# Patient Record
Sex: FEMALE | URBAN - METROPOLITAN AREA
[De-identification: names, ages, dates, MRNs, and addresses within clinical notes are randomized per-mention and may not be internally consistent; named-entity substitution may affect disease eponyms.]

---

## 2020-04-29 PROBLEM — Z00.00 ENCOUNTER FOR PREVENTIVE HEALTH EXAMINATION: Status: ACTIVE | Noted: 2020-04-29

## 2021-11-29 ENCOUNTER — INPATIENT (INPATIENT)
Facility: HOSPITAL | Age: 86
LOS: 4 days | Discharge: HOME HEALTH | End: 2021-12-04
Attending: INTERNAL MEDICINE | Admitting: INTERNAL MEDICINE
Payer: MEDICARE

## 2021-11-29 VITALS
HEART RATE: 118 BPM | WEIGHT: 149.91 LBS | OXYGEN SATURATION: 100 % | TEMPERATURE: 98 F | DIASTOLIC BLOOD PRESSURE: 66 MMHG | HEIGHT: 60 IN | SYSTOLIC BLOOD PRESSURE: 97 MMHG | RESPIRATION RATE: 15 BRPM

## 2021-11-29 LAB
ALBUMIN SERPL ELPH-MCNC: 2 G/DL — LOW (ref 3.3–5)
ALP SERPL-CCNC: 138 U/L — HIGH (ref 40–120)
ALT FLD-CCNC: 11 U/L — LOW (ref 12–78)
ANION GAP SERPL CALC-SCNC: 8 MMOL/L — SIGNIFICANT CHANGE UP (ref 5–17)
APTT BLD: 30.1 SEC — SIGNIFICANT CHANGE UP (ref 27.5–35.5)
AST SERPL-CCNC: 18 U/L — SIGNIFICANT CHANGE UP (ref 15–37)
BASOPHILS # BLD AUTO: 0.03 K/UL — SIGNIFICANT CHANGE UP (ref 0–0.2)
BASOPHILS NFR BLD AUTO: 0.4 % — SIGNIFICANT CHANGE UP (ref 0–2)
BILIRUB SERPL-MCNC: 0.4 MG/DL — SIGNIFICANT CHANGE UP (ref 0.2–1.2)
BUN SERPL-MCNC: 18 MG/DL — SIGNIFICANT CHANGE UP (ref 7–23)
CALCIUM SERPL-MCNC: 8.2 MG/DL — LOW (ref 8.5–10.1)
CHLORIDE SERPL-SCNC: 111 MMOL/L — HIGH (ref 96–108)
CO2 SERPL-SCNC: 23 MMOL/L — SIGNIFICANT CHANGE UP (ref 22–31)
CREAT SERPL-MCNC: 0.76 MG/DL — SIGNIFICANT CHANGE UP (ref 0.5–1.3)
EOSINOPHIL # BLD AUTO: 0.17 K/UL — SIGNIFICANT CHANGE UP (ref 0–0.5)
EOSINOPHIL NFR BLD AUTO: 2.5 % — SIGNIFICANT CHANGE UP (ref 0–6)
FLUAV AG NPH QL: SIGNIFICANT CHANGE UP
FLUBV AG NPH QL: SIGNIFICANT CHANGE UP
GLUCOSE SERPL-MCNC: 159 MG/DL — HIGH (ref 70–99)
HCT VFR BLD CALC: 23.6 % — LOW (ref 34.5–45)
HGB BLD-MCNC: 6.9 G/DL — CRITICAL LOW (ref 11.5–15.5)
IMM GRANULOCYTES NFR BLD AUTO: 0.4 % — SIGNIFICANT CHANGE UP (ref 0–1.5)
INR BLD: 2.22 RATIO — HIGH (ref 0.88–1.16)
LYMPHOCYTES # BLD AUTO: 1.97 K/UL — SIGNIFICANT CHANGE UP (ref 1–3.3)
LYMPHOCYTES # BLD AUTO: 28.7 % — SIGNIFICANT CHANGE UP (ref 13–44)
MAGNESIUM SERPL-MCNC: 2.2 MG/DL — SIGNIFICANT CHANGE UP (ref 1.6–2.6)
MCHC RBC-ENTMCNC: 22 PG — LOW (ref 27–34)
MCHC RBC-ENTMCNC: 29.2 GM/DL — LOW (ref 32–36)
MCV RBC AUTO: 75.2 FL — LOW (ref 80–100)
MONOCYTES # BLD AUTO: 0.39 K/UL — SIGNIFICANT CHANGE UP (ref 0–0.9)
MONOCYTES NFR BLD AUTO: 5.7 % — SIGNIFICANT CHANGE UP (ref 2–14)
NEUTROPHILS # BLD AUTO: 4.28 K/UL — SIGNIFICANT CHANGE UP (ref 1.8–7.4)
NEUTROPHILS NFR BLD AUTO: 62.3 % — SIGNIFICANT CHANGE UP (ref 43–77)
NRBC # BLD: 0 /100 WBCS — SIGNIFICANT CHANGE UP (ref 0–0)
PHOSPHATE SERPL-MCNC: 2.5 MG/DL — SIGNIFICANT CHANGE UP (ref 2.5–4.5)
PLATELET # BLD AUTO: 305 K/UL — SIGNIFICANT CHANGE UP (ref 150–400)
POTASSIUM SERPL-MCNC: 3.1 MMOL/L — LOW (ref 3.5–5.3)
POTASSIUM SERPL-SCNC: 3.1 MMOL/L — LOW (ref 3.5–5.3)
PROT SERPL-MCNC: 6.9 GM/DL — SIGNIFICANT CHANGE UP (ref 6–8.3)
PROTHROM AB SERPL-ACNC: 24.8 SEC — HIGH (ref 10.6–13.6)
RBC # BLD: 3.14 M/UL — LOW (ref 3.8–5.2)
RBC # FLD: 17.2 % — HIGH (ref 10.3–14.5)
SARS-COV-2 RNA SPEC QL NAA+PROBE: SIGNIFICANT CHANGE UP
SODIUM SERPL-SCNC: 142 MMOL/L — SIGNIFICANT CHANGE UP (ref 135–145)
WBC # BLD: 6.87 K/UL — SIGNIFICANT CHANGE UP (ref 3.8–10.5)
WBC # FLD AUTO: 6.87 K/UL — SIGNIFICANT CHANGE UP (ref 3.8–10.5)

## 2021-11-29 PROCEDURE — 70450 CT HEAD/BRAIN W/O DYE: CPT | Mod: 26,MA

## 2021-11-29 PROCEDURE — 93010 ELECTROCARDIOGRAM REPORT: CPT

## 2021-11-29 PROCEDURE — 99291 CRITICAL CARE FIRST HOUR: CPT

## 2021-11-29 RX ORDER — SODIUM CHLORIDE 9 MG/ML
1000 INJECTION INTRAMUSCULAR; INTRAVENOUS; SUBCUTANEOUS ONCE
Refills: 0 | Status: COMPLETED | OUTPATIENT
Start: 2021-11-29 | End: 2021-11-29

## 2021-11-29 RX ORDER — POTASSIUM CHLORIDE 20 MEQ
10 PACKET (EA) ORAL
Refills: 0 | Status: COMPLETED | OUTPATIENT
Start: 2021-11-29 | End: 2021-11-30

## 2021-11-29 RX ADMIN — SODIUM CHLORIDE 1000 MILLILITER(S): 9 INJECTION INTRAMUSCULAR; INTRAVENOUS; SUBCUTANEOUS at 23:55

## 2021-11-29 NOTE — ED ADULT NURSE REASSESSMENT NOTE - NS ED NURSE REASSESS COMMENT FT1
pt found to be soiled upon arrival to the ED. pt cleaned by staff. patient and linen clean and dry. pt placed in gown. on cardiac monitor at bedside pt found to be soiled upon arrival to the ED. pt cleaned by staff. patient and linen clean and dry. pt placed in gown. on cardiac monitor at bedside. seizure precautions intact. fall and skin precautions intact

## 2021-11-29 NOTE — ED ADULT NURSE NOTE - OBJECTIVE STATEMENT
pt received to bed 2 s/p witnessed seizure per EMS. as per pt's son-in-law Charley, pt had a seizure secondary to low blood pressure. hx stroke with residual left sided weakness. pt is confused at baseline. family states that patient is at her baseline mental status at this time. on cardiac monitor at bedside

## 2021-11-30 DIAGNOSIS — G40.909 EPILEPSY, UNSPECIFIED, NOT INTRACTABLE, WITHOUT STATUS EPILEPTICUS: ICD-10-CM

## 2021-11-30 DIAGNOSIS — K56.41 FECAL IMPACTION: ICD-10-CM

## 2021-11-30 DIAGNOSIS — N30.00 ACUTE CYSTITIS WITHOUT HEMATURIA: ICD-10-CM

## 2021-11-30 DIAGNOSIS — E78.5 HYPERLIPIDEMIA, UNSPECIFIED: ICD-10-CM

## 2021-11-30 DIAGNOSIS — D62 ACUTE POSTHEMORRHAGIC ANEMIA: ICD-10-CM

## 2021-11-30 DIAGNOSIS — I48.20 CHRONIC ATRIAL FIBRILLATION, UNSPECIFIED: ICD-10-CM

## 2021-11-30 DIAGNOSIS — E87.6 HYPOKALEMIA: ICD-10-CM

## 2021-11-30 DIAGNOSIS — I10 ESSENTIAL (PRIMARY) HYPERTENSION: ICD-10-CM

## 2021-11-30 LAB
ABO RH CONFIRMATION: SIGNIFICANT CHANGE UP
ALBUMIN SERPL ELPH-MCNC: 2 G/DL — LOW (ref 3.3–5)
ALP SERPL-CCNC: 105 U/L — SIGNIFICANT CHANGE UP (ref 40–120)
ALT FLD-CCNC: 10 U/L — LOW (ref 12–78)
ANION GAP SERPL CALC-SCNC: 8 MMOL/L — SIGNIFICANT CHANGE UP (ref 5–17)
ANION GAP SERPL CALC-SCNC: 8 MMOL/L — SIGNIFICANT CHANGE UP (ref 5–17)
APPEARANCE UR: ABNORMAL
AST SERPL-CCNC: 13 U/L — LOW (ref 15–37)
BACTERIA # UR AUTO: ABNORMAL
BILIRUB SERPL-MCNC: 0.6 MG/DL — SIGNIFICANT CHANGE UP (ref 0.2–1.2)
BILIRUB UR-MCNC: ABNORMAL
BLD GP AB SCN SERPL QL: SIGNIFICANT CHANGE UP
BUN SERPL-MCNC: 11 MG/DL — SIGNIFICANT CHANGE UP (ref 7–23)
BUN SERPL-MCNC: 14 MG/DL — SIGNIFICANT CHANGE UP (ref 7–23)
CALCIUM SERPL-MCNC: 8 MG/DL — LOW (ref 8.5–10.1)
CALCIUM SERPL-MCNC: 8.1 MG/DL — LOW (ref 8.5–10.1)
CHLORIDE SERPL-SCNC: 110 MMOL/L — HIGH (ref 96–108)
CHLORIDE SERPL-SCNC: 115 MMOL/L — HIGH (ref 96–108)
CO2 SERPL-SCNC: 20 MMOL/L — LOW (ref 22–31)
CO2 SERPL-SCNC: 23 MMOL/L — SIGNIFICANT CHANGE UP (ref 22–31)
COLOR SPEC: ABNORMAL
CREAT SERPL-MCNC: 0.59 MG/DL — SIGNIFICANT CHANGE UP (ref 0.5–1.3)
CREAT SERPL-MCNC: 0.73 MG/DL — SIGNIFICANT CHANGE UP (ref 0.5–1.3)
DIFF PNL FLD: ABNORMAL
DIGOXIN SERPL-MCNC: <.1 NG/ML — LOW (ref 0.8–2)
EPI CELLS # UR: SIGNIFICANT CHANGE UP
GLUCOSE BLDC GLUCOMTR-MCNC: 160 MG/DL — HIGH (ref 70–99)
GLUCOSE SERPL-MCNC: 104 MG/DL — HIGH (ref 70–99)
GLUCOSE SERPL-MCNC: 141 MG/DL — HIGH (ref 70–99)
GLUCOSE UR QL: NEGATIVE MG/DL — SIGNIFICANT CHANGE UP
HCT VFR BLD CALC: 27.6 % — LOW (ref 34.5–45)
HCT VFR BLD CALC: 28 % — LOW (ref 34.5–45)
HGB BLD-MCNC: 8.2 G/DL — LOW (ref 11.5–15.5)
HGB BLD-MCNC: 8.3 G/DL — LOW (ref 11.5–15.5)
KETONES UR-MCNC: NEGATIVE — SIGNIFICANT CHANGE UP
LEUKOCYTE ESTERASE UR-ACNC: ABNORMAL
MAGNESIUM SERPL-MCNC: 2.2 MG/DL — SIGNIFICANT CHANGE UP (ref 1.6–2.6)
MCHC RBC-ENTMCNC: 23.3 PG — LOW (ref 27–34)
MCHC RBC-ENTMCNC: 23.5 PG — LOW (ref 27–34)
MCHC RBC-ENTMCNC: 29.3 GM/DL — LOW (ref 32–36)
MCHC RBC-ENTMCNC: 30.1 GM/DL — LOW (ref 32–36)
MCV RBC AUTO: 78.2 FL — LOW (ref 80–100)
MCV RBC AUTO: 79.5 FL — LOW (ref 80–100)
NITRITE UR-MCNC: POSITIVE
NRBC # BLD: 0 /100 WBCS — SIGNIFICANT CHANGE UP (ref 0–0)
NRBC # BLD: 0 /100 WBCS — SIGNIFICANT CHANGE UP (ref 0–0)
PH UR: 6.5 — SIGNIFICANT CHANGE UP (ref 5–8)
PHOSPHATE SERPL-MCNC: 1.8 MG/DL — LOW (ref 2.5–4.5)
PLATELET # BLD AUTO: 283 K/UL — SIGNIFICANT CHANGE UP (ref 150–400)
PLATELET # BLD AUTO: 283 K/UL — SIGNIFICANT CHANGE UP (ref 150–400)
POTASSIUM SERPL-MCNC: 2.9 MMOL/L — CRITICAL LOW (ref 3.5–5.3)
POTASSIUM SERPL-MCNC: 4.2 MMOL/L — SIGNIFICANT CHANGE UP (ref 3.5–5.3)
POTASSIUM SERPL-SCNC: 2.9 MMOL/L — CRITICAL LOW (ref 3.5–5.3)
POTASSIUM SERPL-SCNC: 4.2 MMOL/L — SIGNIFICANT CHANGE UP (ref 3.5–5.3)
PROT SERPL-MCNC: 6.5 GM/DL — SIGNIFICANT CHANGE UP (ref 6–8.3)
PROT UR-MCNC: 500 MG/DL
RBC # BLD: 3.52 M/UL — LOW (ref 3.8–5.2)
RBC # BLD: 3.53 M/UL — LOW (ref 3.8–5.2)
RBC # FLD: 18 % — HIGH (ref 10.3–14.5)
RBC # FLD: 18.4 % — HIGH (ref 10.3–14.5)
RBC CASTS # UR COMP ASSIST: >50 /HPF (ref 0–4)
SODIUM SERPL-SCNC: 141 MMOL/L — SIGNIFICANT CHANGE UP (ref 135–145)
SODIUM SERPL-SCNC: 143 MMOL/L — SIGNIFICANT CHANGE UP (ref 135–145)
SP GR SPEC: 1.01 — SIGNIFICANT CHANGE UP (ref 1.01–1.02)
TSH SERPL-MCNC: 4.19 UIU/ML — HIGH (ref 0.36–3.74)
UROBILINOGEN FLD QL: NEGATIVE MG/DL — SIGNIFICANT CHANGE UP
WBC # BLD: 5.47 K/UL — SIGNIFICANT CHANGE UP (ref 3.8–10.5)
WBC # BLD: 5.88 K/UL — SIGNIFICANT CHANGE UP (ref 3.8–10.5)
WBC # FLD AUTO: 5.47 K/UL — SIGNIFICANT CHANGE UP (ref 3.8–10.5)
WBC # FLD AUTO: 5.88 K/UL — SIGNIFICANT CHANGE UP (ref 3.8–10.5)
WBC UR QL: ABNORMAL

## 2021-11-30 PROCEDURE — 99291 CRITICAL CARE FIRST HOUR: CPT

## 2021-11-30 PROCEDURE — 74174 CTA ABD&PLVS W/CONTRAST: CPT | Mod: 26,MA

## 2021-11-30 PROCEDURE — 99222 1ST HOSP IP/OBS MODERATE 55: CPT

## 2021-11-30 PROCEDURE — 93010 ELECTROCARDIOGRAM REPORT: CPT

## 2021-11-30 RX ORDER — HALOPERIDOL DECANOATE 100 MG/ML
1 INJECTION INTRAMUSCULAR ONCE
Refills: 0 | Status: COMPLETED | OUTPATIENT
Start: 2021-11-30 | End: 2021-11-30

## 2021-11-30 RX ORDER — LEVETIRACETAM 250 MG/1
750 TABLET, FILM COATED ORAL
Refills: 0 | Status: DISCONTINUED | OUTPATIENT
Start: 2021-11-30 | End: 2021-12-04

## 2021-11-30 RX ORDER — POTASSIUM CHLORIDE 20 MEQ
40 PACKET (EA) ORAL ONCE
Refills: 0 | Status: COMPLETED | OUTPATIENT
Start: 2021-11-30 | End: 2021-11-30

## 2021-11-30 RX ORDER — DILTIAZEM HCL 120 MG
17 CAPSULE, EXT RELEASE 24 HR ORAL ONCE
Refills: 0 | Status: COMPLETED | OUTPATIENT
Start: 2021-11-30 | End: 2021-11-30

## 2021-11-30 RX ORDER — LEVETIRACETAM 250 MG/1
1000 TABLET, FILM COATED ORAL ONCE
Refills: 0 | Status: COMPLETED | OUTPATIENT
Start: 2021-11-30 | End: 2021-11-30

## 2021-11-30 RX ORDER — ATORVASTATIN CALCIUM 80 MG/1
20 TABLET, FILM COATED ORAL AT BEDTIME
Refills: 0 | Status: DISCONTINUED | OUTPATIENT
Start: 2021-11-30 | End: 2021-12-04

## 2021-11-30 RX ORDER — LOSARTAN POTASSIUM 100 MG/1
25 TABLET, FILM COATED ORAL DAILY
Refills: 0 | Status: DISCONTINUED | OUTPATIENT
Start: 2021-11-30 | End: 2021-12-01

## 2021-11-30 RX ORDER — CEFTRIAXONE 500 MG/1
1000 INJECTION, POWDER, FOR SOLUTION INTRAMUSCULAR; INTRAVENOUS ONCE
Refills: 0 | Status: COMPLETED | OUTPATIENT
Start: 2021-11-30 | End: 2021-11-30

## 2021-11-30 RX ORDER — SODIUM,POTASSIUM PHOSPHATES 278-250MG
2 POWDER IN PACKET (EA) ORAL
Refills: 0 | Status: COMPLETED | OUTPATIENT
Start: 2021-11-30 | End: 2021-12-02

## 2021-11-30 RX ORDER — METOPROLOL TARTRATE 50 MG
25 TABLET ORAL
Refills: 0 | Status: DISCONTINUED | OUTPATIENT
Start: 2021-11-30 | End: 2021-12-04

## 2021-11-30 RX ORDER — POTASSIUM CHLORIDE 20 MEQ
10 PACKET (EA) ORAL
Refills: 0 | Status: COMPLETED | OUTPATIENT
Start: 2021-11-30 | End: 2021-11-30

## 2021-11-30 RX ORDER — CEFTRIAXONE 500 MG/1
1000 INJECTION, POWDER, FOR SOLUTION INTRAMUSCULAR; INTRAVENOUS
Refills: 0 | Status: COMPLETED | OUTPATIENT
Start: 2021-12-01 | End: 2021-12-03

## 2021-11-30 RX ORDER — ACETAMINOPHEN 500 MG
1000 TABLET ORAL ONCE
Refills: 0 | Status: COMPLETED | OUTPATIENT
Start: 2021-11-30 | End: 2021-11-30

## 2021-11-30 RX ORDER — LEVETIRACETAM 250 MG/1
500 TABLET, FILM COATED ORAL
Refills: 0 | Status: DISCONTINUED | OUTPATIENT
Start: 2021-11-30 | End: 2021-11-30

## 2021-11-30 RX ORDER — LACTULOSE 10 G/15ML
10 SOLUTION ORAL
Refills: 0 | Status: COMPLETED | OUTPATIENT
Start: 2021-11-30 | End: 2021-12-01

## 2021-11-30 RX ORDER — MINERAL OIL
133 OIL (ML) MISCELLANEOUS ONCE
Refills: 0 | Status: COMPLETED | OUTPATIENT
Start: 2021-11-30 | End: 2021-11-30

## 2021-11-30 RX ORDER — SENNA PLUS 8.6 MG/1
2 TABLET ORAL AT BEDTIME
Refills: 0 | Status: DISCONTINUED | OUTPATIENT
Start: 2021-11-30 | End: 2021-12-04

## 2021-11-30 RX ADMIN — Medication 40 MILLIEQUIVALENT(S): at 14:24

## 2021-11-30 RX ADMIN — ATORVASTATIN CALCIUM 20 MILLIGRAM(S): 80 TABLET, FILM COATED ORAL at 21:20

## 2021-11-30 RX ADMIN — Medication 100 MILLIEQUIVALENT(S): at 12:31

## 2021-11-30 RX ADMIN — Medication 100 MILLIEQUIVALENT(S): at 02:47

## 2021-11-30 RX ADMIN — LACTULOSE 10 GRAM(S): 10 SOLUTION ORAL at 17:14

## 2021-11-30 RX ADMIN — HALOPERIDOL DECANOATE 1 MILLIGRAM(S): 100 INJECTION INTRAMUSCULAR at 19:24

## 2021-11-30 RX ADMIN — LEVETIRACETAM 500 MILLIGRAM(S): 250 TABLET, FILM COATED ORAL at 17:13

## 2021-11-30 RX ADMIN — Medication 2 PACKET(S): at 21:20

## 2021-11-30 RX ADMIN — Medication 1000 MILLIGRAM(S): at 19:53

## 2021-11-30 RX ADMIN — Medication 1 MILLIGRAM(S): at 22:41

## 2021-11-30 RX ADMIN — CEFTRIAXONE 1000 MILLIGRAM(S): 500 INJECTION, POWDER, FOR SOLUTION INTRAMUSCULAR; INTRAVENOUS at 04:18

## 2021-11-30 RX ADMIN — SENNA PLUS 2 TABLET(S): 8.6 TABLET ORAL at 21:20

## 2021-11-30 RX ADMIN — Medication 100 MILLIEQUIVALENT(S): at 14:37

## 2021-11-30 RX ADMIN — Medication 17 MILLIGRAM(S): at 19:36

## 2021-11-30 RX ADMIN — Medication 133 MILLILITER(S): at 03:22

## 2021-11-30 RX ADMIN — Medication 100 MILLIEQUIVALENT(S): at 15:54

## 2021-11-30 RX ADMIN — Medication 100 MILLIEQUIVALENT(S): at 04:42

## 2021-11-30 RX ADMIN — LOSARTAN POTASSIUM 25 MILLIGRAM(S): 100 TABLET, FILM COATED ORAL at 06:39

## 2021-11-30 RX ADMIN — Medication 100 MILLIEQUIVALENT(S): at 01:55

## 2021-11-30 RX ADMIN — CEFTRIAXONE 100 MILLIGRAM(S): 500 INJECTION, POWDER, FOR SOLUTION INTRAMUSCULAR; INTRAVENOUS at 03:48

## 2021-11-30 RX ADMIN — Medication 10 MILLIEQUIVALENT(S): at 02:47

## 2021-11-30 RX ADMIN — SODIUM CHLORIDE 1000 MILLILITER(S): 9 INJECTION INTRAMUSCULAR; INTRAVENOUS; SUBCUTANEOUS at 03:49

## 2021-11-30 RX ADMIN — Medication 400 MILLIGRAM(S): at 19:49

## 2021-11-30 RX ADMIN — LEVETIRACETAM 400 MILLIGRAM(S): 250 TABLET, FILM COATED ORAL at 04:22

## 2021-11-30 RX ADMIN — Medication 85 MILLIMOLE(S): at 22:43

## 2021-11-30 NOTE — PATIENT PROFILE ADULT - FALL HARM RISK - RISK INTERVENTIONS
Assistance OOB with selected safe patient handling equipment/Communicate Fall Risk and Risk Factors to all staff, patient, and family/Discuss with provider need for PT consult/Monitor for mental status changes/Monitor gait and stability/Reorient to person, place and time as needed/Use of alarms - bed, chair and/or voice tab/Visual Cue: Yellow wristband/Bed in lowest position, wheels locked, appropriate side rails in place/Call bell, personal items and telephone in reach/Instruct patient to call for assistance before getting out of bed or chair/Non-slip footwear when patient is out of bed/[place] to call system/Purposeful Proactive Rounding Assistance OOB with selected safe patient handling equipment/Assistance with ambulation/Communicate Fall Risk and Risk Factors to all staff, patient, and family/Discuss with provider need for PT consult/Monitor gait and stability/Reinforce activity limits and safety measures with patient and family/Visual Cue: Yellow wristband/Bed in lowest position, wheels locked, appropriate side rails in place/Call bell, personal items and telephone in reach/Instruct patient to call for assistance before getting out of bed or chair/Non-slip footwear when patient is out of bed/[place] to call system/Physically safe environment - no spills, clutter or unnecessary equipment/Purposeful Proactive Rounding/Room/bathroom lighting operational, light cord in reach

## 2021-11-30 NOTE — H&P ADULT - NSHPPHYSICALEXAM_GEN_ALL_CORE
Physical exam:  General: patient in no acute distress, resting comfortably  Head:  Atraumatic, Normocephalic  Eyes: EOMI, PERRLA, clear sclera  Neck: Supple, thyroid nontender, non enlarged  Cardio: S1/S2 +ve, regular rate and rhythm, no M/G/R  Resp: clear to ausculation bilaterally, no rales or wheezes  GI: abdomen soft, nontender, non distended, no guarding, BS +ve x 4  Ext: no significant pedal edema  Neuro: awake and alert.  CN 2-12 intact, gross movement in al  Skin: No rashes or lesions

## 2021-11-30 NOTE — ED PROVIDER NOTE - PHYSICAL EXAMINATION
Constitutional: Elderly appearing, awake, alert, and in no apparent distress.  ENMT: Airway patent.  Eyes: Clear bilaterally, pupils equal, round and reactive to light.  Cardiac: Normal rate, regular rhythm.  Heart sounds S1, S2.  Respiratory: Breath sounds clear and equal bilaterally.  Gastrointestinal: Abdomen soft, non-tender, no guarding.  Neurological: Weakness in LUE and LLE as compared to R.  Skin: No evidence of rash.

## 2021-11-30 NOTE — H&P ADULT - HISTORY OF PRESENT ILLNESS
Patient is an 88F with a PMH of CVA with L sided weakness, Afib on eliquis, seizures on keppra, HTN, HLD, nephrolithiasis w/ L ureteral stent who was sent to the ED for seizures.  Patient unable to provide full history.  Per ED attending who spoke to family members, patient was found to have a 1-2 minute seizure with generalized shaking and post ictal phase afterwards.  Reports that she has had seizures since her stroke in 2010.  Patient noted to have significant hematuria in ED.  Tachycardic to 143 and initially hypotensive in ED, labs reveal anemia and hypokalemia.  CT shows bladder cystitis vs malignancy.  Will admit to tele.

## 2021-11-30 NOTE — ED ADULT NURSE REASSESSMENT NOTE - NS ED NURSE REASSESS COMMENT FT1
pt is resting in bed 2. blood transfusion in progress. no adverse reactions to blood transfusion. no acute distress noted. will continue to monitor. on cardiac monitor at bedside. pt is admitted and is awaiting bed assignment.

## 2021-11-30 NOTE — H&P ADULT - PROBLEM SELECTOR PLAN 2
WBCs and leuk esterase in urine  Started on ceftriaxone in ED.  Continue for now  Deescalate antibiotic when cultures return

## 2021-11-30 NOTE — ED PROVIDER NOTE - CARE PLAN
Principal Discharge DX:	Anemia due to acute blood loss  Secondary Diagnosis:	Hematuria  Secondary Diagnosis:	Seizures  Secondary Diagnosis:	Acute UTI   1

## 2021-11-30 NOTE — ED PROVIDER NOTE - CLINICAL SUMMARY MEDICAL DECISION MAKING FREE TEXT BOX
pt with impressive hematuria following straight cath in setting of recent L ureteral stent removal. notably anemic to 6's yet per son -- no formal hx of anemia. Plan cta a/p for further assessment. Plan also transfusion and admission.     Pt has been 90/60s pressure with map low 60s in ED. Mentation at baseline. will monitor for response to prbcs and dispo from there. pt with impressive hematuria following straight cath in setting of recent L ureteral stent. notably anemic to 6's yet per son -- no formal hx of anemia. Plan cta a/p for further assessment. Plan also transfusion and admission.     Pt has been 90/60s pressure with map low 60s in ED. Mentation at baseline. will monitor for response to prbcs and dispo from there.

## 2021-11-30 NOTE — CONSULT NOTE ADULT - ASSESSMENT
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 Seizure probable provoked by severe anemia  - no aspirin/AC due to anemia; re-start when ok with primary team  - Lipitor for secondary stroke prevention  - will follow    Thank you for the consult.

## 2021-11-30 NOTE — DOWNTIME INTERRUPTION NOTE - WHICH MANUAL FORMS INITIATED?
Down time from Monday, 29 November 2021 At 10pm to Tuesday 30 November 2021 at 12 noon. Documentation done on paper chart.

## 2021-11-30 NOTE — ED PROVIDER NOTE - PROGRESS NOTE DETAILS
Festus Novoa DO: dr. haynes made aware of the case via phone and added to the chart as a consultant.

## 2021-11-30 NOTE — CONSULT NOTE ADULT - SUBJECTIVE AND OBJECTIVE BOX
HPI:  Patient is an 88F with a PMH of CVA with L sided weakness, Afib on eliquis, seizures on keppra, HTN, HLD, nephrolithiasis w/ L ureteral stent who was sent to the ED for seizures.  Patient unable to provide full history.  Per ED attending who spoke to family members, patient was found to have a 1-2 minute seizure with generalized shaking and post ictal phase afterwards.  Reports that she has had seizures since her stroke in .  Patient noted to have significant hematuria in ED.  Tachycardic to 143 and initially hypotensive in ED, labs reveal anemia and hypokalemia. Transfused 1u PRBC in ED. CT shows bladder cystitis vs malignancy.  Will admit to tele. (2021 05:32). Urology consulted for hematuria.     Patient seen and examined at bedside. Confused, poor historian, unable to answer questions appropriately. Afebrile, no acute events per RN.     PAST MEDICAL & SURGICAL HISTORY:  Seizure disorder    Review of Systems:  Unable to assess due to poor mental status    MEDICATIONS  (STANDING):  atorvastatin 20 milliGRAM(s) Oral at bedtime  cefTRIAXone   IVPB 1000 milliGRAM(s) IV Intermittent <User Schedule>  levETIRAcetam 500 milliGRAM(s) Oral two times a day  losartan 25 milliGRAM(s) Oral daily  potassium chloride  10 mEq/100 mL IVPB 10 milliEquivalent(s) IV Intermittent every 1 hour  senna 2 Tablet(s) Oral at bedtime    MEDICATIONS  (PRN):    Allergies    No Known Allergies    Intolerances      SOCIAL HISTORY   Unable to assess due to poor mental status    FAMILY HISTORY:  FH: HTN (hypertension)    Vital Signs Last 24 Hrs  T(C): 36.2 (2021 11:26), Max: 37.5 (2021 23:40)  T(F): 97.1 (2021 11:26), Max: 99.5 (2021 23:40)  HR: 98 (:) (76 - 143)  BP: 119/80 (2021 11:) (89/51 - 128/68)  BP(mean): 61 (2021 01:57) (59 - 63)  RR: 16 (:) (15 - 29)  SpO2: 96% (2021 11:26) (92% - 100%)    Physical Exam:  General:  Appears stated age, NAD  Eyes: SUNNI  HENT:  WNL, no JVD  Chest: clear breath sounds  Cardiovascular: Regular rate & rhythm  Abdomen: soft, non tender, non distended  : no suprapubic tenderness or CVAT  Extremities: calf soft, non tender b/l  Neuro: AAO x1 to self    LABS:                        8.2    5.88  )-----------( 283      ( 2021 08:26 )             28.0         141  |  110<H>  |  14  ----------------------------<  104<H>  2.9<LL>   |  23  |  0.59    Ca    8.0<L>      2021 08:26  Phos  2.5       Mg     2.2         TPro  6.9  /  Alb  2.0<L>  /  TBili  0.4  /  DBili  x   /  AST  18  /  ALT  11<L>  /  AlkPhos  138<H>      PT/INR - ( 2021 21:53 )   PT: 24.8 sec;   INR: 2.22 ratio         PTT - ( 2021 21:53 )  PTT:30.1 sec  Urinalysis Basic - ( 2021 00:12 )    Color: Red / Appearance: very cloudy / S.015 / pH: x  Gluc: x / Ketone: Negative  / Bili: Small / Urobili: Negative mg/dL   Blood: x / Protein: 500 mg/dL / Nitrite: Positive   Leuk Esterase: Trace / RBC: >50 /HPF / WBC 6-10   Sq Epi: x / Non Sq Epi: Occasional / Bacteria: Few      RADIOLOGY & ADDITIONAL STUDIES:  c< from: CT Angio Abdomen and Pelvis w/ IV Cont (21 @ 00:44) >  EXAM:  CT ANGIO ABD PELV (W)AW IC                            PROCEDURE DATE:  2021          INTERPRETATION:  CLINICAL INFORMATION: Anemia.    COMPARISON: None.    CONTRAST/COMPLICATIONS:  IV Contrast: Omnipaque 350  95 cc administered   05 ccdiscarded  Oral Contrast: NONE  Complications: None reported at time of study completion    PROCEDURE:  GI bleeding protocol.  CT of the Abdomen and Pelvis was performed.  Precontrast, Arterial and Delayed phases were performed.  Sagittal and coronalreformats were performed.    FINDINGS:  LOWER CHEST: Cardiomegaly with left ventricular and biatrial enlargement is partially visualized.  Atherosclerotic calcifications of the partially visualized right coronary artery.    LIVER: A 5 mm hypodense focus in the left hepatic lobe (12:20) is too small accurately characterize by CT scan.  BILE DUCTS: Normal caliber.  GALLBLADDER: Cholelithiasis.  SPLEEN: Within normal limits.  PANCREAS: Within normal limits.  ADRENALS: Nonspecific adrenal gland thickening bilaterally.  A1.3 cm left adrenal lesion with macroscopic fat is compatible with an adrenal myelolipoma (12:25-26).  KIDNEYS/URETERS: A left nephroureteral stent appears in good position.  No hydronephrosis.  A nonobstructing left renal stone measures approximately 5 mm (12:38).  Multiple subcentimeter hypodense foci in the left kidney probably represent small cysts.    BLADDER: Underdistended with asymmetric anterior wall thickening and areas of mucosal hyperemia.  Additionally there appears to be an 8 mm cystic focus along the anterior bladder wall in the midline (18:110).  REPRODUCTIVE ORGANS: Uterus and adnexa appear within normal limits.    BOWEL: No bowel obstruction, overt bowel wall thickening or mesenteric edema.. Normal appendix.  The rectum is distended with stool to approximately 8.8 x 8.2 cm; no rectal wall thickening or significant perirectal inflammatory changes.  No evidence of active contrast extravasation into the lumen of the gastrointestinal tract..  PERITONEUM: No ascites.  VESSELS: Scattered atherosclerotic calcifications of the aortoiliac tree and proximal thigh vasculature.  RETROPERITONEUM/LYMPH NODES: No lymphadenopathy.  ABDOMINAL WALL: The bones are diffusely osteopenic.  Mild lumbar scoliosis; predominant curvature is convex to the right with the apex at L4-L5.  Mild degenerative changes in the spine.  Mild to moderate spinal canal stenosis at L3-L4 and L4-L5.    IMPRESSION:  1.  No evidence of active GI bleeding at the time of the CT examination.  2.  The rectum is distended with stool to approximately 8.8 x 8.2 cm.  Fecal impaction should be excluded clinically.  No gross CT evidence of stercoral colitis.  3.  The bladder is underdistended with asymmetric wall thickening and areas of mucosal hyperemia.  Underlying cystitis or bladder malignancy is not excluded.  An 8 mm cystic focus along the anterior bladder wall may represent an intravesical urachal cyst.  Cystoscopy may be performed as clinically warranted.  4.  Left nephroureteral stent in good position.  No hydronephrosis.  A nonobstructing left renal stone measures approximately 5 mm    A/P  88F with a PMH of CVA with L sided weakness, Afib on eliquis, seizures on keppra, HTN, HLD, nephrolithiasis w/ L ureteral stent who was sent to the ED for seizures. With significant hematuria in ED after being straight cath'd. With cystitis vs bladder malignancy. s/p 1u PRBC in ED for Hgb 6.9.    - recommend placing marie to evaluate for any further hematuria  - continue Abx  - monitor CBC  - continue medical management and supportive care  - d/w Dr. Martinez  
HPI: 88 year old woman with hx of dementia, stroke, A-fib, HTN, HLD, seizure disorder, and GERD presenting with hematuria and seizure. She had a seizure for ~1 minute and then brought to the hospital. Patient has been having persistent hematuria since stent removal. She has been compliant with Keppra 500mg BID. Her last seizure was in 2020. Patient noted to have severe anemia with Hgb 6.9.    PMHx: Stroke, Seizure disorder, A-fib, HTN, HLD, Dementia, Nephrolithiasis  PSHx: uretal stent  FHx: HTN  Social Hx: non-smoker, no etoh, no illicit drug use  Allergies: NKDA  Meds: See EMR  ROS: unable to obtain due to dementia    Vitals: Temp 97.1F    RR 16    HR 98    /80  General: NAD  Neuro Exam: AOx2. Mild dysarthria. No aphasia. Mild left facial droop. PERRL. BTT. Left hemiparesis with increased tone on the left. Reflexes slightly brisk on the left. Toes down. Finger to nose and heel to shin intact. Gait exam deferred.     NIHSS- 7    Labs, CT head reviewed

## 2021-11-30 NOTE — H&P ADULT - PROBLEM SELECTOR PLAN 1
Hb 6.9 on admission.  Will transfuse one unit PRBCs and repeat CBC in the AM  Likely from hematuria - Urology consulted by ED

## 2021-11-30 NOTE — PROGRESS NOTE ADULT - SUBJECTIVE AND OBJECTIVE BOX
HPI: Patient is an 88F with a PMH of CVA with L sided weakness, Afib on eliquis, seizures on keppra, HTN, HLD, nephrolithiasis w/ L ureteral stent who was sent to the ED for seizures. Patient unable to provide full history.  Per ED attending who spoke to family members, patient was found to have a 1-2 minute seizure with generalized shaking and post ictal phase afterwards.  Reports that she has had seizures since her stroke in .  Patient noted to have significant hematuria in ED.  Tachycardic to 143 and initially hypotensive in ED, labs reveal anemia and hypokalemia.  CT shows bladder cystitis vs malignancy.     Subjective: Patient seen and examined at bedside, has no complaints to offer, confused. Alert and oriented to self (only name) and place only. No further episodes of seizures since admission.    : [place], [place] [id number]    Spoke with patient's son at bedside later, patient is generally confused, seems back to baseline now.     REVIEW OF SYSTEMS:    CONSTITUTIONAL: No weakness, fevers or chills  EYES/ENT: No visual changes;  No vertigo or throat pain   NECK: No pain or stiffness  RESPIRATORY: No cough, wheezing, hemoptysis; No shortness of breath  CARDIOVASCULAR: No chest pain or palpitations  GASTROINTESTINAL: No abdominal or epigastric pain. No nausea, vomiting, or hematemesis; No diarrhea or constipation. No melena or hematochezia.  GENITOURINARY: No dysuria, frequency or hematuria  NEUROLOGICAL: No numbness or weakness  SKIN: No itching, rashes    Vital Signs Last 24 Hrs  T(C): 36.2 (2021 11:26), Max: 37.5 (2021 23:40)  T(F): 97.1 (2021 11:26), Max: 99.5 (2021 23:40)  HR: 98 (2021 11:) (76 - 143)  BP: 119/80 (2021 11:) (89/51 - 128/68)  BP(mean): 61 (2021 01:57) (59 - 63)  RR: 16 (:) (15 - 29)  SpO2: 96% (:) (92% - 100%)    PHYSICAL EXAM:  GENERAL: NAD, lying in bed comfortably  HEAD:  Atraumatic, Normocephalic  EYES: conjunctiva and sclera clear  ENT: Moist mucous membranes  NECK: Supple, No JVD  CHEST/LUNG: Clear to auscultation bilaterally; No rales, rhonchi, wheezing. Unlabored respirations  HEART: Regular rate and rhythm; No murmurs, rubs, or gallops  ABDOMEN: Bowel sounds present; Soft, Nontender, Nondistended.   EXTREMITIES:  2+ Peripheral Pulses, brisk capillary refill. No clubbing, cyanosis, or edema  NERVOUS SYSTEM:  Alert & Oriented X3, speech clear. No deficits   MSK: FROM all 4 extremities, full and equal strength    MEDICATIONS  (STANDING):  atorvastatin 20 milliGRAM(s) Oral at bedtime  cefTRIAXone   IVPB 1000 milliGRAM(s) IV Intermittent <User Schedule>  levETIRAcetam 500 milliGRAM(s) Oral two times a day  losartan 25 milliGRAM(s) Oral daily  potassium chloride  10 mEq/100 mL IVPB 10 milliEquivalent(s) IV Intermittent every 1 hour  senna 2 Tablet(s) Oral at bedtime      LABS:                          8.2    5.88  )-----------( 283      ( 2021 08:26 )             28.0     2021 08:26    141    |  110    |  14     ----------------------------<  104    2.9     |  23     |  0.59     Ca    8.0        2021 08:26  Phos  2.5       2021 21:53  Mg     2.2       2021 21:53    TPro  6.9    /  Alb  2.0    /  TBili  0.4    /  DBili  x      /  AST  18     /  ALT  11     /  AlkPhos  138    2021 21:53    LIVER FUNCTIONS - ( 2021 21:53 )  Alb: 2.0 g/dL / Pro: 6.9 gm/dL / ALK PHOS: 138 U/L / ALT: 11 U/L / AST: 18 U/L / GGT: x           PT/INR - ( 2021 21:53 )   PT: 24.8 sec;   INR: 2.22 ratio         PTT - ( 2021 21:53 )  PTT:30.1 sec  CAPILLARY BLOOD GLUCOSE            Urinalysis Basic - ( 2021 00:12 )    Color: Red / Appearance: very cloudy / S.015 / pH: x  Gluc: x / Ketone: Negative  / Bili: Small / Urobili: Negative mg/dL   Blood: x / Protein: 500 mg/dL / Nitrite: Positive   Leuk Esterase: Trace / RBC: >50 /HPF / WBC 6-10   Sq Epi: x / Non Sq Epi: Occasional / Bacteria: Few        RADIOLOGY:  < from: CT Angio Abdomen and Pelvis w/ IV Cont (21 @ 00:44) >  IMPRESSION:  1.  No evidence of active GI bleeding at the time of the CT examination.  2.  The rectum is distended with stool to approximately 8.8 x 8.2 cm.  Fecal impaction should be excluded clinically.  No gross CT evidence of stercoral colitis.  3.  The bladder is underdistended with asymmetric wall thickening and areas of mucosal hyperemia.  Underlying cystitis or bladder malignancy is not excluded.  An 8 mm cystic focus along the anterior bladder wall may represent an intravesical urachal cyst.  Cystoscopy may be performed as clinically warranted.  4.  Left nephroureteral stent in good position.  No hydronephrosis.  A nonobstructing left renal stone measures approximately 5 mm

## 2021-11-30 NOTE — ED PROVIDER NOTE - OBJECTIVE STATEMENT
88F pmhx CVA in 2010 with residual L sided weakness on eliquis, seizures on keppra, htn, hld, hypokalemia, chronic pain, nephrolithiasis s/p L ureteral stent removed this week by ?Dr. Beauchamp ([facility]), presenting to ED s/p seizure at home. Per son at bedside, pt has had seizures occasionally over the years since her stroke. This evening she had a 1-2 minute seizure during which he describes diffuse shaking and unresponsiveness followed by singificant drowsiness, c/w past seizures. To his knowledge pt has been compliant with her keppra (most managed by his sister).     Son also notes that pt has had persistent hematuria since her stent removal. As far as he knows, despite this pt was instructed to continue with her eliquis prescription. 88F pmhx CVA in 2010 with residual L sided weakness on eliquis, seizures on keppra, htn, hld, hypokalemia, chronic pain, nephrolithiasis w/ L ureteral stent scheduled for near term removal under ?Dr. Beauchamp ([facility] affiliated), presenting to ED s/p seizure at home. Per son at bedside, pt has had seizures occasionally over the years since her stroke. This evening she had a 1-2 minute seizure during which he describes diffuse shaking and unresponsiveness followed by singificant drowsiness, c/w past seizures. To his knowledge pt has been compliant with her keppra (most managed by his sister).     Son also notes that pt has had persistent hematuria since her stent removal. As far as he knows, despite this pt was instructed to continue with her eliquis prescription.

## 2021-11-30 NOTE — H&P ADULT - NSHPLABSRESULTS_GEN_ALL_CORE
Recent Vitals  T(C): 36.7 (21 @ 03:49), Max: 37.5 (21 @ 23:40)  HR: 91 (21 @ 04:24) (76 - 143)  BP: 120/64 (21 @ 04:24) (89/51 - 120/64)  RR: 17 (21 @ 04:24) (15 - 29)  SpO2: 97% (21 @ 04:24) (92% - 100%)                        6.9    6.87  )-----------( 305      ( 2021 21:53 )             23.6         142  |  111<H>  |  18  ----------------------------<  159<H>  3.1<L>   |  23  |  0.76    Ca    8.2<L>      2021 21:53  Phos  2.5       Mg     2.2         TPro  6.9  /  Alb  2.0<L>  /  TBili  0.4  /  DBili  x   /  AST  18  /  ALT  11<L>  /  AlkPhos  138<H>      PT/INR - ( 2021 21:53 )   PT: 24.8 sec;   INR: 2.22 ratio         PTT - ( 2021 21:53 )  PTT:30.1 sec  LIVER FUNCTIONS - ( 2021 21:53 )  Alb: 2.0 g/dL / Pro: 6.9 gm/dL / ALK PHOS: 138 U/L / ALT: 11 U/L / AST: 18 U/L / GGT: x           Urinalysis Basic - ( 2021 00:12 )    Color: Red / Appearance: very cloudy / S.015 / pH: x  Gluc: x / Ketone: Negative  / Bili: Small / Urobili: Negative mg/dL   Blood: x / Protein: 500 mg/dL / Nitrite: Positive   Leuk Esterase: Trace / RBC: >50 /HPF / WBC 6-10   Sq Epi: x / Non Sq Epi: Occasional / Bacteria: Few        Home Medications:

## 2021-11-30 NOTE — RAPID RESPONSE TEAM SUMMARY - NSMEDICATIONSRRT_GEN_ALL_CORE
IV cardizem x 1   Resumed home metoprolol  give IV tylenol x 1 for possible pain - patient unable to verbalized but is screaming and was taking pregabalin at home and is not on it in the hospital

## 2021-11-30 NOTE — RAPID RESPONSE TEAM SUMMARY - NSSITUATIONBACKGROUNDRRT_GEN_ALL_CORE
88F with a PMH of CVA with L sided weakness, Afib on eliquis, seizures on keppra, HTN, HLD, nephrolithiasis w/ L ureteral stent who was sent to the ED for seizures and was admitted for acute blood loss anemia secondary to hematuria and UTI. RRT was called after the patient went into A. fib w/ RVR w/ HR in 140s-150s and was given IV cardizem, which brought down her HR to 100s. Will upgrade to tele. Will also check cbc, cmp, mg, phos, TSH and dig level as digoxin was one of her home meds in the past, but it is unclear if she is still on it. Physical exam revealed irregularly irregular rhythm and LLE edema - will get LE doppler. Will call Dr. Gaytan w/ update. PA will Follow up labs and replace electrolytes.  88F with a PMH of CVA with L sided weakness, Afib on eliquis, seizures on keppra, HTN, HLD, nephrolithiasis w/ L ureteral stent who was sent to the ED for seizures and was admitted for acute blood loss anemia secondary to hematuria and UTI. RRT was called after the patient went into A. fib w/ RVR w/ HR in 140s-150s and was given IV cardizem, which brought down her HR to 100s. Will upgrade to tele. Will also check cbc, cmp, mg, phos, TSH and dig level as digoxin was one of her home meds in the past, but it is unclear if she is still on it. Physical exam revealed irregularly irregular rhythm and LLE edema - will get LE doppler. Will call Dr. Gaytan w/ carin. PA will Follow up labs and replace electrolytes.     Critical care time: 37 min

## 2021-12-01 DIAGNOSIS — F03.90 UNSPECIFIED DEMENTIA WITHOUT BEHAVIORAL DISTURBANCE: ICD-10-CM

## 2021-12-01 DIAGNOSIS — Z29.9 ENCOUNTER FOR PROPHYLACTIC MEASURES, UNSPECIFIED: ICD-10-CM

## 2021-12-01 LAB
ANION GAP SERPL CALC-SCNC: 5 MMOL/L — SIGNIFICANT CHANGE UP (ref 5–17)
BASOPHILS # BLD AUTO: 0.03 K/UL — SIGNIFICANT CHANGE UP (ref 0–0.2)
BASOPHILS NFR BLD AUTO: 0.6 % — SIGNIFICANT CHANGE UP (ref 0–2)
BUN SERPL-MCNC: 10 MG/DL — SIGNIFICANT CHANGE UP (ref 7–23)
CALCIUM SERPL-MCNC: 7.7 MG/DL — LOW (ref 8.5–10.1)
CHLORIDE SERPL-SCNC: 119 MMOL/L — HIGH (ref 96–108)
CO2 SERPL-SCNC: 22 MMOL/L — SIGNIFICANT CHANGE UP (ref 22–31)
CREAT SERPL-MCNC: 0.51 MG/DL — SIGNIFICANT CHANGE UP (ref 0.5–1.3)
CULTURE RESULTS: NO GROWTH — SIGNIFICANT CHANGE UP
EOSINOPHIL # BLD AUTO: 0.2 K/UL — SIGNIFICANT CHANGE UP (ref 0–0.5)
EOSINOPHIL NFR BLD AUTO: 3.7 % — SIGNIFICANT CHANGE UP (ref 0–6)
GLUCOSE BLDC GLUCOMTR-MCNC: 120 MG/DL — HIGH (ref 70–99)
GLUCOSE SERPL-MCNC: 99 MG/DL — SIGNIFICANT CHANGE UP (ref 70–99)
HCT VFR BLD CALC: 23.1 % — LOW (ref 34.5–45)
HGB BLD-MCNC: 6.9 G/DL — CRITICAL LOW (ref 11.5–15.5)
IMM GRANULOCYTES NFR BLD AUTO: 0.6 % — SIGNIFICANT CHANGE UP (ref 0–1.5)
LYMPHOCYTES # BLD AUTO: 1.8 K/UL — SIGNIFICANT CHANGE UP (ref 1–3.3)
LYMPHOCYTES # BLD AUTO: 33.2 % — SIGNIFICANT CHANGE UP (ref 13–44)
MAGNESIUM SERPL-MCNC: 2 MG/DL — SIGNIFICANT CHANGE UP (ref 1.6–2.6)
MCHC RBC-ENTMCNC: 23.2 PG — LOW (ref 27–34)
MCHC RBC-ENTMCNC: 29.9 GM/DL — LOW (ref 32–36)
MCV RBC AUTO: 77.8 FL — LOW (ref 80–100)
MONOCYTES # BLD AUTO: 0.45 K/UL — SIGNIFICANT CHANGE UP (ref 0–0.9)
MONOCYTES NFR BLD AUTO: 8.3 % — SIGNIFICANT CHANGE UP (ref 2–14)
NEUTROPHILS # BLD AUTO: 2.91 K/UL — SIGNIFICANT CHANGE UP (ref 1.8–7.4)
NEUTROPHILS NFR BLD AUTO: 53.6 % — SIGNIFICANT CHANGE UP (ref 43–77)
NRBC # BLD: 0 /100 WBCS — SIGNIFICANT CHANGE UP (ref 0–0)
PHOSPHATE SERPL-MCNC: 5.5 MG/DL — HIGH (ref 2.5–4.5)
PLATELET # BLD AUTO: 222 K/UL — SIGNIFICANT CHANGE UP (ref 150–400)
POTASSIUM SERPL-MCNC: 4.1 MMOL/L — SIGNIFICANT CHANGE UP (ref 3.5–5.3)
POTASSIUM SERPL-SCNC: 4.1 MMOL/L — SIGNIFICANT CHANGE UP (ref 3.5–5.3)
RBC # BLD: 2.97 M/UL — LOW (ref 3.8–5.2)
RBC # FLD: 18.4 % — HIGH (ref 10.3–14.5)
SODIUM SERPL-SCNC: 146 MMOL/L — HIGH (ref 135–145)
SPECIMEN SOURCE: SIGNIFICANT CHANGE UP
T4 FREE SERPL-MCNC: 1.2 NG/DL — SIGNIFICANT CHANGE UP (ref 0.9–1.8)
TSH SERPL-MCNC: 1.75 UU/ML — SIGNIFICANT CHANGE UP (ref 0.36–3.74)
WBC # BLD: 5.42 K/UL — SIGNIFICANT CHANGE UP (ref 3.8–10.5)
WBC # FLD AUTO: 5.42 K/UL — SIGNIFICANT CHANGE UP (ref 3.8–10.5)

## 2021-12-01 PROCEDURE — 99233 SBSQ HOSP IP/OBS HIGH 50: CPT

## 2021-12-01 RX ORDER — INFLUENZA VIRUS VACCINE 15; 15; 15; 15 UG/.5ML; UG/.5ML; UG/.5ML; UG/.5ML
0.7 SUSPENSION INTRAMUSCULAR ONCE
Refills: 0 | Status: COMPLETED | OUTPATIENT
Start: 2021-12-01 | End: 2021-12-04

## 2021-12-01 RX ORDER — TAMSULOSIN HYDROCHLORIDE 0.4 MG/1
0.4 CAPSULE ORAL AT BEDTIME
Refills: 0 | Status: DISCONTINUED | OUTPATIENT
Start: 2021-12-01 | End: 2021-12-04

## 2021-12-01 RX ORDER — SODIUM CHLORIDE 9 MG/ML
500 INJECTION, SOLUTION INTRAVENOUS ONCE
Refills: 0 | Status: COMPLETED | OUTPATIENT
Start: 2021-12-01 | End: 2021-12-01

## 2021-12-01 RX ADMIN — LACTULOSE 10 GRAM(S): 10 SOLUTION ORAL at 06:29

## 2021-12-01 RX ADMIN — SENNA PLUS 2 TABLET(S): 8.6 TABLET ORAL at 22:22

## 2021-12-01 RX ADMIN — Medication 2 PACKET(S): at 18:57

## 2021-12-01 RX ADMIN — Medication 25 MILLIGRAM(S): at 18:57

## 2021-12-01 RX ADMIN — LEVETIRACETAM 750 MILLIGRAM(S): 250 TABLET, FILM COATED ORAL at 18:56

## 2021-12-01 RX ADMIN — CEFTRIAXONE 100 MILLIGRAM(S): 500 INJECTION, POWDER, FOR SOLUTION INTRAMUSCULAR; INTRAVENOUS at 03:19

## 2021-12-01 RX ADMIN — LACTULOSE 10 GRAM(S): 10 SOLUTION ORAL at 22:22

## 2021-12-01 RX ADMIN — Medication 2 PACKET(S): at 12:29

## 2021-12-01 RX ADMIN — LEVETIRACETAM 750 MILLIGRAM(S): 250 TABLET, FILM COATED ORAL at 06:29

## 2021-12-01 RX ADMIN — Medication 2 PACKET(S): at 22:22

## 2021-12-01 RX ADMIN — ATORVASTATIN CALCIUM 20 MILLIGRAM(S): 80 TABLET, FILM COATED ORAL at 22:21

## 2021-12-01 RX ADMIN — SODIUM CHLORIDE 1000 MILLILITER(S): 9 INJECTION, SOLUTION INTRAVENOUS at 00:35

## 2021-12-01 RX ADMIN — TAMSULOSIN HYDROCHLORIDE 0.4 MILLIGRAM(S): 0.4 CAPSULE ORAL at 22:21

## 2021-12-01 NOTE — PROVIDER CONTACT NOTE (CRITICAL VALUE NOTIFICATION) - BACKGROUND
Patient with Dx: Anemia due to Acute blood loss, and Hematuria.  No active bleeding noted at this time.  Safety maintained.

## 2021-12-01 NOTE — PROGRESS NOTE ADULT - SUBJECTIVE AND OBJECTIVE BOX
Patient is a 88y old  Female who presents with a chief complaint of seizures, hematuria (01 Dec 2021 15:42)      INTERVAL HPI/ OVERNIGHT EVENTS: Pt was seen and examined at bedside today, No significant overnight events, son at bedside, pt denies any complaints but is unreliable     MEDICATIONS  (STANDING):  atorvastatin 20 milliGRAM(s) Oral at bedtime  cefTRIAXone   IVPB 1000 milliGRAM(s) IV Intermittent <User Schedule>  influenza  Vaccine (HIGH DOSE) 0.7 milliLiter(s) IntraMuscular once  lactulose Syrup 10 Gram(s) Oral two times a day  levETIRAcetam 750 milliGRAM(s) Oral two times a day  metoprolol tartrate 25 milliGRAM(s) Oral two times a day  potassium phosphate / sodium phosphate Powder (PHOS-NaK) 2 Packet(s) Oral four times a day with meals  senna 2 Tablet(s) Oral at bedtime    MEDICATIONS  (PRN):      Allergies    No Known Allergies    Intolerances        REVIEW OF SYSTEMS:    Unable to examine due to [ ] Encephalopathy [ x] Advanced Dementia [ ] Expressive Aphasia [ ] Non-verbal patient    CONSTITUTIONAL: No fever, NO generalized weakness/Fatigue, No weight loss  EYES: No eye pain, visual disturbances, or discharge  ENMT:  No difficulty hearing, tinnitus, vertigo; No sinus or throat pain  NECK: No pain or stiffness  RESPIRATORY: No shortness of breath,  cough, wheezing, sputum or hemoptysis   CARDIOVASCULAR: No chest pain, palpitations, or leg swelling  GASTROINTESTINAL: No abdominal pain. No nausea, vomiting, diarrhea or constipation. No melena or hematochezia.  GENITOURINARY: No dysuria, frequency, hematuria, or incontinence  NEUROLOGICAL: No headaches, Dizziness, memory loss, loss of strength, numbness, or tremors  SKIN: No itching, burning, rashes, or lesions   MUSCULOSKELETAL: No joint pain or swelling; No muscle, back, or extremity pain  PSYCHIATRIC: No depression, anxiety, mood swings, or difficulty sleeping  HEME/LYMPH: No easy bruising, or bleeding gums      Vital Signs Last 24 Hrs  T(C): 36.8 (01 Dec 2021 11:00), Max: 37.4 (2021 19:24)  T(F): 98.2 (01 Dec 2021 11:00), Max: 99.4 (2021 19:24)  HR: 98 (01 Dec 2021 14:34) (84 - 158)  BP: 124/74 (01 Dec 2021 14:34) (92/61 - 137/72)  BP(mean): --  RR: 18 (01 Dec 2021 14:34) (18 - 20)  SpO2: 95% (01 Dec 2021 14:34) (93% - 98%)    PHYSICAL EXAM:  GENERAL: NAD, well-developed, well-groomed  HEAD:  Atraumatic, Normocephalic  EYES: conjunctiva and sclera clear  ENMT: Moist mucous membranes  NECK: Supple, No JVD, Normal thyroid  CHEST/LUNG: Clear to Auscultation bilaterally; No rales, rhonchi, wheezing, or rubs  HEART: Regular rate and rhythm; No murmurs, rubs, or gallops  ABDOMEN: Soft, Nontender, Nondistended; Bowel sounds present  : indwelling marie; gross hematuria   EXTREMITIES:  2+ Peripheral Pulses, No clubbing, cyanosis, or edema  SKIN: No rashes or lesions  NERVOUS SYSTEM: confused, Motor strength appears intact     LABS:                        6.9    5.42  )-----------( 222      ( 01 Dec 2021 06:28 )             23.1     12-01    146<H>  |  119<H>  |  10  ----------------------------<  99  4.1   |  22  |  0.51    Ca    7.7<L>      01 Dec 2021 06:28  Phos  5.5     12-  Mg     2.0     12-    TPro  6.5  /  Alb  2.0<L>  /  TBili  0.6  /  DBili  x   /  AST  13<L>  /  ALT  10<L>  /  AlkPhos  105  11-30    PT/INR - ( 2021 21:53 )   PT: 24.8 sec;   INR: 2.22 ratio         PTT - ( 2021 21:53 )  PTT:30.1 sec  Urinalysis Basic - ( 2021 00:12 )    Color: Red / Appearance: very cloudy / S.015 / pH: x  Gluc: x / Ketone: Negative  / Bili: Small / Urobili: Negative mg/dL   Blood: x / Protein: 500 mg/dL / Nitrite: Positive   Leuk Esterase: Trace / RBC: >50 /HPF / WBC 6-10   Sq Epi: x / Non Sq Epi: Occasional / Bacteria: Few      CAPILLARY BLOOD GLUCOSE      POCT Blood Glucose.: 120 mg/dL (01 Dec 2021 16:52)  POCT Blood Glucose.: 160 mg/dL (2021 19:23)        Culture - Urine (collected 21)  Source: Clean Catch Clean Catch (Midstream)  Final Report (21):    No growth        RADIOLOGY & ADDITIONAL TESTS:          Imaging Personally Reviewed:  [ ] YES  [ ] NO    Consultant(s) Notes Reviewed:  [ ] YES  [ ] NO    Care Discussed with Consultants/Other Providers [x ] YES  [ ] NO

## 2021-12-01 NOTE — PROGRESS NOTE ADULT - SUBJECTIVE AND OBJECTIVE BOX
Patient seen and examined , son at bedside  Confused , not english speaking.   Tolerating diet.  Denies chest pain, dyspnea, cough. Has indwelling marie with gross hematuria    T(F): 98.7 (12-01-21 @ 18:14), Max: 99.4 (11-30-21 @ 19:24)  HR: 119 (12-01-21 @ 18:14) (84 - 158)  BP: 103/67 (12-01-21 @ 18:14) (92/61 - 137/72)  RR: 20 (12-01-21 @ 18:14) (18 - 20)  SpO2: 91% (12-01-21 @ 18:14) (91% - 98%)  Wt(kg): --  CAPILLARY BLOOD GLUCOSE      POCT Blood Glucose.: 120 mg/dL (01 Dec 2021 16:52)  POCT Blood Glucose.: 160 mg/dL (30 Nov 2021 19:23)      PHYSICAL EXAM:  General: NAD, alert and awake  HEENT: NCAT, EOMI, conjunctiva clear  Chest: nonlabored respirations, good inspiratory effort  Abdomen: soft, NTND.   Extremities: no pedal edema or calf tenderness noted   : No CVA or SP tenderness. Indwelling marie catheter with hematuria    LABS:                        6.9    5.42  )-----------( 222      ( 01 Dec 2021 06:28 )             23.1   12-01    146<H>  |  119<H>  |  10  ----------------------------<  99  4.1   |  22  |  0.51    Ca    7.7<L>      01 Dec 2021 06:28  Phos  5.5     12-01  Mg     2.0     12-01    TPro  6.5  /  Alb  2.0<L>  /  TBili  0.6  /  DBili  x   /  AST  13<L>  /  ALT  10<L>  /  AlkPhos  105  11-30  PT/INR - ( 29 Nov 2021 21:53 )   PT: 24.8 sec;   INR: 2.22 ratio         PTT - ( 29 Nov 2021 21:53 )  PTT:30.1 sec  I&O's Detail    30 Nov 2021 07:01  -  01 Dec 2021 07:00  --------------------------------------------------------  IN:  Total IN: 0 mL    OUT:    Stool (mL): 2 mL  Total OUT: 2 mL    Total NET: -2 mL      01 Dec 2021 07:01  -  01 Dec 2021 19:11  --------------------------------------------------------  IN:    Oral Fluid: 240 mL  Total IN: 240 mL    OUT:    Voided (mL): 400 mL  Total OUT: 400 mL    Total NET: -160 mL         3

## 2021-12-01 NOTE — PROGRESS NOTE ADULT - SUBJECTIVE AND OBJECTIVE BOX
Neurology Progress Note    RRT noted from yesterday. No seizures noted.     Neuro Exam: AOx2. Mild dysarthria. Mild left facial droop. PERRL. Left hemiparesis with increased tone on the left.    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 Seizure probable provoked by severe anemia  - no aspirin/AC due to anemia; re-start when ok with primary team  - Lipitor for secondary stroke prevention  - will follow

## 2021-12-02 LAB
ANION GAP SERPL CALC-SCNC: 6 MMOL/L — SIGNIFICANT CHANGE UP (ref 5–17)
BLD GP AB SCN SERPL QL: SIGNIFICANT CHANGE UP
BUN SERPL-MCNC: 9 MG/DL — SIGNIFICANT CHANGE UP (ref 7–23)
CALCIUM SERPL-MCNC: 8.2 MG/DL — LOW (ref 8.5–10.1)
CHLORIDE SERPL-SCNC: 115 MMOL/L — HIGH (ref 96–108)
CO2 SERPL-SCNC: 20 MMOL/L — LOW (ref 22–31)
CREAT SERPL-MCNC: 0.49 MG/DL — LOW (ref 0.5–1.3)
GLUCOSE SERPL-MCNC: 110 MG/DL — HIGH (ref 70–99)
HCT VFR BLD CALC: 35.3 % — SIGNIFICANT CHANGE UP (ref 34.5–45)
HCT VFR BLD CALC: 35.8 % — SIGNIFICANT CHANGE UP (ref 34.5–45)
HGB BLD-MCNC: 11 G/DL — LOW (ref 11.5–15.5)
HGB BLD-MCNC: 11.5 G/DL — SIGNIFICANT CHANGE UP (ref 11.5–15.5)
MCHC RBC-ENTMCNC: 24.2 PG — LOW (ref 27–34)
MCHC RBC-ENTMCNC: 24.5 PG — LOW (ref 27–34)
MCHC RBC-ENTMCNC: 31.2 GM/DL — LOW (ref 32–36)
MCHC RBC-ENTMCNC: 32.1 GM/DL — SIGNIFICANT CHANGE UP (ref 32–36)
MCV RBC AUTO: 76.2 FL — LOW (ref 80–100)
MCV RBC AUTO: 77.6 FL — LOW (ref 80–100)
NRBC # BLD: 0 /100 WBCS — SIGNIFICANT CHANGE UP (ref 0–0)
NRBC # BLD: 0 /100 WBCS — SIGNIFICANT CHANGE UP (ref 0–0)
PLATELET # BLD AUTO: 223 K/UL — SIGNIFICANT CHANGE UP (ref 150–400)
PLATELET # BLD AUTO: 246 K/UL — SIGNIFICANT CHANGE UP (ref 150–400)
POTASSIUM SERPL-MCNC: 4 MMOL/L — SIGNIFICANT CHANGE UP (ref 3.5–5.3)
POTASSIUM SERPL-SCNC: 4 MMOL/L — SIGNIFICANT CHANGE UP (ref 3.5–5.3)
RBC # BLD: 4.55 M/UL — SIGNIFICANT CHANGE UP (ref 3.8–5.2)
RBC # BLD: 4.7 M/UL — SIGNIFICANT CHANGE UP (ref 3.8–5.2)
RBC # FLD: 18 % — HIGH (ref 10.3–14.5)
RBC # FLD: 18.2 % — HIGH (ref 10.3–14.5)
SODIUM SERPL-SCNC: 141 MMOL/L — SIGNIFICANT CHANGE UP (ref 135–145)
WBC # BLD: 5.96 K/UL — SIGNIFICANT CHANGE UP (ref 3.8–10.5)
WBC # BLD: 6.5 K/UL — SIGNIFICANT CHANGE UP (ref 3.8–10.5)
WBC # FLD AUTO: 5.96 K/UL — SIGNIFICANT CHANGE UP (ref 3.8–10.5)
WBC # FLD AUTO: 6.5 K/UL — SIGNIFICANT CHANGE UP (ref 3.8–10.5)

## 2021-12-02 PROCEDURE — 99233 SBSQ HOSP IP/OBS HIGH 50: CPT

## 2021-12-02 PROCEDURE — 93970 EXTREMITY STUDY: CPT | Mod: 26

## 2021-12-02 RX ADMIN — Medication 25 MILLIGRAM(S): at 05:54

## 2021-12-02 RX ADMIN — Medication 25 MILLIGRAM(S): at 17:01

## 2021-12-02 RX ADMIN — ATORVASTATIN CALCIUM 20 MILLIGRAM(S): 80 TABLET, FILM COATED ORAL at 22:34

## 2021-12-02 RX ADMIN — TAMSULOSIN HYDROCHLORIDE 0.4 MILLIGRAM(S): 0.4 CAPSULE ORAL at 22:34

## 2021-12-02 RX ADMIN — LEVETIRACETAM 750 MILLIGRAM(S): 250 TABLET, FILM COATED ORAL at 05:54

## 2021-12-02 RX ADMIN — LEVETIRACETAM 750 MILLIGRAM(S): 250 TABLET, FILM COATED ORAL at 17:01

## 2021-12-02 RX ADMIN — CEFTRIAXONE 100 MILLIGRAM(S): 500 INJECTION, POWDER, FOR SOLUTION INTRAMUSCULAR; INTRAVENOUS at 04:13

## 2021-12-02 RX ADMIN — Medication 2 PACKET(S): at 07:39

## 2021-12-02 NOTE — PROGRESS NOTE ADULT - TIME BILLING
Lab test review, Radiology Review, Vitals review, Consultant review and discussion, Physical examination, IDR, Assessment and plan; Plan discussion with family
Lab test review, Radiology Review, Vitals review, Consultant review and discussion, Physical examination, IDR, Assessment and plan; Plan discussion with family

## 2021-12-02 NOTE — PROGRESS NOTE ADULT - SUBJECTIVE AND OBJECTIVE BOX
Patient is a 88y old  Female who presents with a chief complaint of seizures, hematuria (02 Dec 2021 15:07)      INTERVAL HPI/ OVERNIGHT EVENTS: Pt was seen and examined at bedside today, pt continues to have gross hematuria, pt continues to be confused unable to provide ROS      MEDICATIONS  (STANDING):  atorvastatin 20 milliGRAM(s) Oral at bedtime  cefTRIAXone   IVPB 1000 milliGRAM(s) IV Intermittent <User Schedule>  influenza  Vaccine (HIGH DOSE) 0.7 milliLiter(s) IntraMuscular once  levETIRAcetam 750 milliGRAM(s) Oral two times a day  metoprolol tartrate 25 milliGRAM(s) Oral two times a day  senna 2 Tablet(s) Oral at bedtime  tamsulosin 0.4 milliGRAM(s) Oral at bedtime    MEDICATIONS  (PRN):      Allergies    No Known Allergies    Intolerances        REVIEW OF SYSTEMS:    Unable to examine due to [ ] Encephalopathy [x ] Advanced Dementia [ ] Expressive Aphasia [ ] Non-verbal patient          Vital Signs Last 24 Hrs  T(C): 36.8 (02 Dec 2021 10:29), Max: 37.1 (01 Dec 2021 18:14)  T(F): 98.3 (02 Dec 2021 10:29), Max: 98.7 (01 Dec 2021 18:14)  HR: 111 (02 Dec 2021 10:29) (80 - 119)  BP: 133/88 (02 Dec 2021 10:29) (103/67 - 135/82)  BP(mean): --  RR: 18 (02 Dec 2021 10:29) (18 - 20)  SpO2: 94% (02 Dec 2021 10:29) (91% - 100%)    PHYSICAL EXAM:  GENERAL: NAD, well-developed, well-groomed  HEAD:  Atraumatic, Normocephalic  EYES: conjunctiva and sclera clear  ENMT: Moist mucous membranes  NECK: Supple, No JVD, Normal thyroid  CHEST/LUNG: Clear to Auscultation bilaterally; No rales, rhonchi, wheezing, or rubs  HEART: Regular rate and rhythm; No murmurs, rubs, or gallops  ABDOMEN: Soft, Nontender, Nondistended; Bowel sounds present  : indwelling marie; gross hematuria   EXTREMITIES:  2+ Peripheral Pulses, No clubbing, cyanosis, or edema  SKIN: No rashes or lesions  NERVOUS SYSTEM: confused, Motor strength appears intact     LABS:                        11.0   5.96  )-----------( 223      ( 02 Dec 2021 14:12 )             35.3     12-02    141  |  115<H>  |  9   ----------------------------<  110<H>  4.0   |  20<L>  |  0.49<L>    Ca    8.2<L>      02 Dec 2021 07:40  Phos  5.5     12-01  Mg     2.0     12-01    TPro  6.5  /  Alb  2.0<L>  /  TBili  0.6  /  DBili  x   /  AST  13<L>  /  ALT  10<L>  /  AlkPhos  105  11-30        CAPILLARY BLOOD GLUCOSE            Culture - Urine (collected 11-30-21)  Source: Clean Catch Clean Catch (Midstream)  Final Report (12-01-21):    No growth        RADIOLOGY & ADDITIONAL TESTS:          Imaging Personally Reviewed:  [ ] YES  [ ] NO    Consultant(s) Notes Reviewed:  [ ] YES  [ ] NO    Care Discussed with Consultants/Other Providers [x ] YES  [ ] NO

## 2021-12-02 NOTE — DIETITIAN INITIAL EVALUATION ADULT. - PERTINENT LABORATORY DATA
12-02 Na141 mmol/L Glu 110 mg/dL<H> K+ 4.0 mmol/L Cr  0.49 mg/dL<L> BUN 9 mg/dL 12-01 Phos 5.5 mg/dL<H> 11-30 Alb 2.0 g/dL<L>

## 2021-12-02 NOTE — PROGRESS NOTE ADULT - PROBLEM SELECTOR PLAN 7
DVTp: SCDs    Plan discussed with Daughter and Son in law at bedside DVTp: SCDs    Plan discussed with Daughter and Son in law at bedside Marcos [phone]                                         Son: [phone]

## 2021-12-02 NOTE — PROGRESS NOTE ADULT - SUBJECTIVE AND OBJECTIVE BOX
Pt seen and examined at bedside in no distress.    T(F): 98.3 (12-02-21 @ 10:29), Max: 98.7 (12-01-21 @ 18:14)  HR: 111 (12-02-21 @ 10:29) (80 - 119)  BP: 133/88 (12-02-21 @ 10:29) (103/67 - 135/82)  RR: 18 (12-02-21 @ 10:29) (18 - 20)  SpO2: 94% (12-02-21 @ 10:29) (91% - 100%)    PHYSICAL EXAM:  General: Awake, alert, NAD  CV: Irregularly irregular   Lung: Respirations nonlabored  Abdomen: Soft  : Lyle with light pink urine    LABS:                        11.0   5.96  )-----------( 223      ( 02 Dec 2021 14:12 )             35.3     12-02    141  |  115<H>  |  9   ----------------------------<  110<H>  4.0   |  20<L>  |  0.49<L>    Ca    8.2<L>      02 Dec 2021 07:40  Phos  5.5     12-01  Mg     2.0     12-01    TPro  6.5  /  Alb  2.0<L>  /  TBili  0.6  /  DBili  x   /  AST  13<L>  /  ALT  10<L>  /  AlkPhos  105  11-30      A/P: 88F with a PMH of CVA with L sided weakness, Afib on Eliquis seizures on keppra, HTN, HLD, nephrolithiasis w/ L ureteral stent 2/2 stone who was sent to the ED for seizures, found to have gross hematuria with ABLA requiring 4uPRBC, appropriate response  - recommend removal of ureteral stent, however, pt not a good candidate for anesthesia, so recommend bedside removal of stent with Dr. Beauchamp in his office. will reach out to Dr. Beauchamp to see if he can accommodate while pt is off eliquis  - discussed with Dr. Martinez and Dr. Samuel

## 2021-12-02 NOTE — PROGRESS NOTE ADULT - SUBJECTIVE AND OBJECTIVE BOX
Neurology Progress Note    No acute events. No seizures noted.     Neuro Exam: AOx2. Mild dysarthria. Mild left facial droop. PERRL. Left hemiparesis with increased tone on the left.    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 Seizure probable provoked by severe anemia  - no aspirin/AC due to anemia; re-start when ok with primary team  - Lipitor for secondary stroke prevention  - will follow

## 2021-12-02 NOTE — DIETITIAN INITIAL EVALUATION ADULT. - OTHER INFO
Unable to interview pt due to cognitive impairment. Per medical record pt lives c daughter, has HHA assistance; completely dependent for ADLs. bedbound.  Consult submitted for pressure ulcer but none documented in flow sheet.  Per CNA pt has a small wound on buttock; RN advised.  No reports of any N/V/C/D or chew/swallowing difficulty.

## 2021-12-02 NOTE — DIETITIAN INITIAL EVALUATION ADULT. - OTHER CALCULATIONS
Ht (cm):   152.4  Wt (kg): 58.6 (12/2)  BMI:     25.2  IBW:  45.5 kg  %IBW:  129%  UBW: unknown  %UBW: unknown

## 2021-12-03 LAB
ANION GAP SERPL CALC-SCNC: 6 MMOL/L — SIGNIFICANT CHANGE UP (ref 5–17)
BUN SERPL-MCNC: 8 MG/DL — SIGNIFICANT CHANGE UP (ref 7–23)
CALCIUM SERPL-MCNC: 8.3 MG/DL — LOW (ref 8.5–10.1)
CHLORIDE SERPL-SCNC: 119 MMOL/L — HIGH (ref 96–108)
CO2 SERPL-SCNC: 20 MMOL/L — LOW (ref 22–31)
CREAT SERPL-MCNC: 0.56 MG/DL — SIGNIFICANT CHANGE UP (ref 0.5–1.3)
GLUCOSE SERPL-MCNC: 113 MG/DL — HIGH (ref 70–99)
HCT VFR BLD CALC: 37.7 % — SIGNIFICANT CHANGE UP (ref 34.5–45)
HGB BLD-MCNC: 11.6 G/DL — SIGNIFICANT CHANGE UP (ref 11.5–15.5)
LEVETIRACETAM SERPL-MCNC: 24.2 UG/ML — SIGNIFICANT CHANGE UP (ref 10–40)
MCHC RBC-ENTMCNC: 23.9 PG — LOW (ref 27–34)
MCHC RBC-ENTMCNC: 30.8 GM/DL — LOW (ref 32–36)
MCV RBC AUTO: 77.6 FL — LOW (ref 80–100)
NRBC # BLD: 0 /100 WBCS — SIGNIFICANT CHANGE UP (ref 0–0)
PLATELET # BLD AUTO: 242 K/UL — SIGNIFICANT CHANGE UP (ref 150–400)
POTASSIUM SERPL-MCNC: 4.5 MMOL/L — SIGNIFICANT CHANGE UP (ref 3.5–5.3)
POTASSIUM SERPL-SCNC: 4.5 MMOL/L — SIGNIFICANT CHANGE UP (ref 3.5–5.3)
RBC # BLD: 4.86 M/UL — SIGNIFICANT CHANGE UP (ref 3.8–5.2)
RBC # FLD: 18.6 % — HIGH (ref 10.3–14.5)
SODIUM SERPL-SCNC: 145 MMOL/L — SIGNIFICANT CHANGE UP (ref 135–145)
WBC # BLD: 6.32 K/UL — SIGNIFICANT CHANGE UP (ref 3.8–10.5)
WBC # FLD AUTO: 6.32 K/UL — SIGNIFICANT CHANGE UP (ref 3.8–10.5)

## 2021-12-03 PROCEDURE — 99232 SBSQ HOSP IP/OBS MODERATE 35: CPT

## 2021-12-03 RX ORDER — LANOLIN ALCOHOL/MO/W.PET/CERES
3 CREAM (GRAM) TOPICAL AT BEDTIME
Refills: 0 | Status: DISCONTINUED | OUTPATIENT
Start: 2021-12-03 | End: 2021-12-04

## 2021-12-03 RX ORDER — PANTOPRAZOLE SODIUM 20 MG/1
40 TABLET, DELAYED RELEASE ORAL
Refills: 0 | Status: DISCONTINUED | OUTPATIENT
Start: 2021-12-04 | End: 2021-12-04

## 2021-12-03 RX ORDER — METOPROLOL TARTRATE 50 MG
25 TABLET ORAL ONCE
Refills: 0 | Status: COMPLETED | OUTPATIENT
Start: 2021-12-03 | End: 2021-12-03

## 2021-12-03 RX ORDER — METOPROLOL TARTRATE 50 MG
5 TABLET ORAL ONCE
Refills: 0 | Status: DISCONTINUED | OUTPATIENT
Start: 2021-12-03 | End: 2021-12-03

## 2021-12-03 RX ORDER — METOPROLOL TARTRATE 50 MG
5 TABLET ORAL ONCE
Refills: 0 | Status: COMPLETED | OUTPATIENT
Start: 2021-12-03 | End: 2021-12-03

## 2021-12-03 RX ORDER — LEVETIRACETAM 250 MG/1
750 TABLET, FILM COATED ORAL ONCE
Refills: 0 | Status: COMPLETED | OUTPATIENT
Start: 2021-12-03 | End: 2021-12-03

## 2021-12-03 RX ORDER — PANTOPRAZOLE SODIUM 20 MG/1
40 TABLET, DELAYED RELEASE ORAL ONCE
Refills: 0 | Status: COMPLETED | OUTPATIENT
Start: 2021-12-03 | End: 2021-12-03

## 2021-12-03 RX ADMIN — Medication 25 MILLIGRAM(S): at 17:30

## 2021-12-03 RX ADMIN — Medication 5 MILLIGRAM(S): at 08:09

## 2021-12-03 RX ADMIN — LEVETIRACETAM 400 MILLIGRAM(S): 250 TABLET, FILM COATED ORAL at 09:55

## 2021-12-03 RX ADMIN — CEFTRIAXONE 100 MILLIGRAM(S): 500 INJECTION, POWDER, FOR SOLUTION INTRAMUSCULAR; INTRAVENOUS at 02:31

## 2021-12-03 RX ADMIN — LEVETIRACETAM 750 MILLIGRAM(S): 250 TABLET, FILM COATED ORAL at 17:32

## 2021-12-03 RX ADMIN — Medication 25 MILLIGRAM(S): at 13:25

## 2021-12-03 RX ADMIN — PANTOPRAZOLE SODIUM 40 MILLIGRAM(S): 20 TABLET, DELAYED RELEASE ORAL at 13:26

## 2021-12-03 NOTE — PROGRESS NOTE ADULT - PROBLEM SELECTOR PLAN 6
confused  continue with supportive care

## 2021-12-03 NOTE — PROGRESS NOTE ADULT - PROBLEM SELECTOR PLAN 1
Neurology consult appreciated   cont w/ Keppra 750mg   seizure precautions.

## 2021-12-03 NOTE — PROGRESS NOTE ADULT - PROBLEM SELECTOR PLAN 7
DVTp: SCDs    Plan discussed with Daughter and Son in law at bedside Marcos: [phone]                                                                                       Son: [phone]

## 2021-12-03 NOTE — PROGRESS NOTE ADULT - PROBLEM SELECTOR PLAN 2
Eliquis held and pt was transfused s/p 2U pRBC  Indwelling marie was placed; pt continues to have gross hematuria   Urology on board   will give another 2U pRBC   cont to monitor h/h, f/u Urology recommendations.
Eliquis held and pt was transfused s/p 4U pRBC  Indwelling marie was placed; pt continues to have gross hematuria   Urology on board   cont to monitor h/h, f/u Urology recommendations.
Eliquis held and pt was transfused s/p 4U pRBC  Indwelling marie was placed; pt continues to have gross hematuria   Urology on board   cont to monitor h/h, f/u Urology recommendations.

## 2021-12-03 NOTE — PROGRESS NOTE ADULT - SUBJECTIVE AND OBJECTIVE BOX
Neurology Progress Note    No acute events. No seizures noted.     Neuro Exam: Drowsy and Ox2. Mild dysarthria. Mild left facial droop. PERRL. Left hemiparesis with increased tone on the left.    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 Seizure probable provoked by severe anemia  - no aspirin/AC due to anemia; re-start when ok with primary team  - Lipitor for secondary stroke prevention  - will follow

## 2021-12-03 NOTE — PROGRESS NOTE ADULT - NUTRITIONAL ASSESSMENT
This patient has been assessed with a concern for Malnutrition and has been determined to have a diagnosis/diagnoses of Moderate protein-calorie malnutrition.    This patient is being managed with:   Diet Easy to Chew-  Low Sodium  Supplement Feeding Modality:  Oral  Health Shake Cans or Servings Per Day:  1       Frequency:  Daily  Entered: Dec  2 2021 12:11PM    

## 2021-12-03 NOTE — CHART NOTE - NSCHARTNOTEFT_GEN_A_CORE
Notified by RN that patient is confused and refusing oral medications this morning.  Scheduled for Metoprolol and Keppra this AM. HR is 101 and /91. Will give IV equivalent of Keppra 750mg IV and metoprolol 5mg IV once.  Primary Team to reassess in the evening if patient needs to be switched from po to iv formulation.

## 2021-12-03 NOTE — PROGRESS NOTE ADULT - SUBJECTIVE AND OBJECTIVE BOX
Patient is a 88y old  Female who presents with a chief complaint of seizures, hematuria (03 Dec 2021 13:50)      INTERVAL HPI/ OVERNIGHT EVENTS: Pt was seen and examined at bedside today, No significant overnight events, pt is confused at bedside, unable to provide to history      MEDICATIONS  (STANDING):  atorvastatin 20 milliGRAM(s) Oral at bedtime  influenza  Vaccine (HIGH DOSE) 0.7 milliLiter(s) IntraMuscular once  levETIRAcetam 750 milliGRAM(s) Oral two times a day  melatonin 3 milliGRAM(s) Oral at bedtime  metoprolol tartrate 25 milliGRAM(s) Oral two times a day  pantoprazole    Tablet 40 milliGRAM(s) Oral before breakfast  senna 2 Tablet(s) Oral at bedtime  tamsulosin 0.4 milliGRAM(s) Oral at bedtime    MEDICATIONS  (PRN):      Allergies    No Known Allergies    Intolerances        REVIEW OF SYSTEMS:    Unable to examine due to [ ] Encephalopathy [ ] Advanced Dementia [ ] Expressive Aphasia [ ] Non-verbal patient        Vital Signs Last 24 Hrs  T(C): 36.7 (03 Dec 2021 11:09), Max: 37.8 (02 Dec 2021 19:09)  T(F): 98 (03 Dec 2021 11:09), Max: 100 (02 Dec 2021 19:09)  HR: 125 (03 Dec 2021 11:09) (93 - 128)  BP: 137/79 (03 Dec 2021 11:09) (118/76 - 157/91)  BP(mean): 90 (02 Dec 2021 19:09) (90 - 90)  RR: 18 (03 Dec 2021 11:09) (18 - 18)  SpO2: 96% (03 Dec 2021 11:09) (94% - 99%)    PHYSICAL EXAM:  GENERAL: NAD, well-developed, well-groomed  HEAD:  Atraumatic, Normocephalic  EYES: conjunctiva and sclera clear  ENMT: Moist mucous membranes  NECK: Supple, No JVD, Normal thyroid  CHEST/LUNG: Clear to Auscultation bilaterally; No rales, rhonchi, wheezing, or rubs  HEART: Regular rate and rhythm; No murmurs, rubs, or gallops  ABDOMEN: Soft, Nontender, Nondistended; Bowel sounds present  : indwelling marie; gross hematuria   EXTREMITIES:  2+ Peripheral Pulses, No clubbing, cyanosis, or edema  SKIN: No rashes or lesions  NERVOUS SYSTEM: confused, Lethargic, unable to assess     LABS:                        11.6   6.32  )-----------( 242      ( 03 Dec 2021 07:02 )             37.7     12-03    145  |  119<H>  |  8   ----------------------------<  113<H>  4.5   |  20<L>  |  0.56    Ca    8.3<L>      03 Dec 2021 07:02          CAPILLARY BLOOD GLUCOSE            Culture - Urine (collected 11-30-21)  Source: Clean Catch Clean Catch (Midstream)  Final Report (12-01-21):    No growth        RADIOLOGY & ADDITIONAL TESTS:          Imaging Personally Reviewed:  [ ] YES  [ ] NO    Consultant(s) Notes Reviewed:  [ ] YES  [ ] NO    Care Discussed with Consultants/Other Providers [x ] YES  [ ] NO

## 2021-12-03 NOTE — PROGRESS NOTE ADULT - ASSESSMENT
Patient is an 88F with a PMH of CVA with L sided weakness, Afib on eliquis, seizures on keppra, HTN, HLD, nephrolithiasis w/ L ureteral stent who was sent to the ED for seizures.  Patient unable to provide full history.  Per ED attending who spoke to family members, patient was found to have a 1-2 minute seizure with generalized shaking and post ictal phase afterwards.  Reports that she has had seizures since her stroke in 2010.  Patient noted to have significant hematuria in ED.  Tachycardic to 143 and initially hypotensive in ED, labs reveal anemia and hypokalemia.  CT shows bladder cystitis vs malignancy.  Will admit to tele.
Patient is an 88F with a PMH of CVA with L sided weakness, Afib on eliquis, seizures on keppra, HTN, HLD, nephrolithiasis w/ L ureteral stent who was sent to the ED for seizures. Patient unable to provide full history.  Per ED attending who spoke to family members, patient was found to have a 1-2 minute seizure with generalized shaking and post ictal phase afterwards.  Reports that she has had seizures since her stroke in 2010.  Patient noted to have significant hematuria in ED.  Tachycardic to 143 and initially hypotensive in ED, labs reveal anemia and hypokalemia.  CT shows bladder cystitis vs malignancy.     #Acute blood loss anemia secondary to hematuria   -CTA abd/pelvis: No GI bleed, evidence of cystitis, or bladder malignancy cannot be excluded, 8 mm cystic focus along the anterior bladder wall may represent an intravesical urachal cyst   -Transfused 1 unit pRBC on admission  -H/H 6.9/23.6 > 8.2/28   -Trend CBC  -Hemodynamically stable at present   -Urology consult pending     #Acute complicated UTI  -Patient with L nephroureteral stent in place and non obstructing renal calculi   -Continue ceftriaxone 1g q24hrs  -Urology consult pending   -F/u urine culture     #Seizure disorder  -Continue keppra 500mg BID  -Seizure precautions     #Hypokalemia  -K: 3.1 > 2.9  -Trend and replete PRN     #Chronic AFib  -Eliquis on hold for now 2/2 hematuria     #HTN  -DASH/TLC  -Continue Losartan 25mg daily   -monitor BP & titrate BP meds PRN    #HTD  -Continue Lipitor 20mg po qhs     #VTE prophylaxis  -SCDs     Above discussed with Dr. Gaytan 
88F with a PMH of CVA with L sided weakness, Afib on Eliquis seizures on keppra, HTN, HLD, nephrolithiasis w/ L ureteral stent who was sent to the ED for seizures. With significant hematuria in ED after being straight cath'd. With cystitis vs bladder malignancy. s/p 1u PRBC in ED for Hgb 6.9.    Patient was stented in [facility] by Dr Beauchamp.   Dr Martinez to review and discuss case with Dr Beauchamp    -Continue with Lyle, Continue PRBC transfusion and continue hold Eliquis   - continue Abx  - monitor CBC  - continue medical management and supportive care  - d/w Dr. Martinez
Patient is an 88F with a PMH of CVA with L sided weakness, Afib on eliquis, seizures on keppra, HTN, HLD, nephrolithiasis w/ L ureteral stent who was sent to the ED for seizures.  Patient unable to provide full history.  Per ED attending who spoke to family members, patient was found to have a 1-2 minute seizure with generalized shaking and post ictal phase afterwards.  Reports that she has had seizures since her stroke in 2010.  Patient noted to have significant hematuria in ED.  Tachycardic to 143 and initially hypotensive in ED, labs reveal anemia and hypokalemia.  CT shows bladder cystitis vs malignancy.  Will admit to tele.
Patient is an 88F with a PMH of CVA with L sided weakness, Afib on eliquis, seizures on keppra, HTN, HLD, nephrolithiasis w/ L ureteral stent who was sent to the ED for seizures.  Patient unable to provide full history.  Per ED attending who spoke to family members, patient was found to have a 1-2 minute seizure with generalized shaking and post ictal phase afterwards.  Reports that she has had seizures since her stroke in 2010.  Patient noted to have significant hematuria in ED.  Tachycardic to 143 and initially hypotensive in ED, labs reveal anemia and hypokalemia.  CT shows bladder cystitis vs malignancy.  Will admit to tele.

## 2021-12-03 NOTE — PROGRESS NOTE ADULT - PROBLEM SELECTOR PLAN 4
holding Eliquis due to hematuria  Afib w/ RVR last night, given IV Cardizem; now stable   cont w/ metoprolol

## 2021-12-04 ENCOUNTER — TRANSCRIPTION ENCOUNTER (OUTPATIENT)
Age: 86
End: 2021-12-04

## 2021-12-04 VITALS
RESPIRATION RATE: 18 BRPM | TEMPERATURE: 98 F | OXYGEN SATURATION: 97 % | DIASTOLIC BLOOD PRESSURE: 78 MMHG | SYSTOLIC BLOOD PRESSURE: 124 MMHG

## 2021-12-04 LAB
ANION GAP SERPL CALC-SCNC: 4 MMOL/L — LOW (ref 5–17)
BUN SERPL-MCNC: 9 MG/DL — SIGNIFICANT CHANGE UP (ref 7–23)
CALCIUM SERPL-MCNC: 8.3 MG/DL — LOW (ref 8.5–10.1)
CHLORIDE SERPL-SCNC: 118 MMOL/L — HIGH (ref 96–108)
CO2 SERPL-SCNC: 22 MMOL/L — SIGNIFICANT CHANGE UP (ref 22–31)
CREAT SERPL-MCNC: 0.5 MG/DL — SIGNIFICANT CHANGE UP (ref 0.5–1.3)
GLUCOSE SERPL-MCNC: 100 MG/DL — HIGH (ref 70–99)
HCT VFR BLD CALC: 36 % — SIGNIFICANT CHANGE UP (ref 34.5–45)
HGB BLD-MCNC: 11.2 G/DL — LOW (ref 11.5–15.5)
MCHC RBC-ENTMCNC: 24.3 PG — LOW (ref 27–34)
MCHC RBC-ENTMCNC: 31.1 GM/DL — LOW (ref 32–36)
MCV RBC AUTO: 78.3 FL — LOW (ref 80–100)
NRBC # BLD: 0 /100 WBCS — SIGNIFICANT CHANGE UP (ref 0–0)
PLATELET # BLD AUTO: 224 K/UL — SIGNIFICANT CHANGE UP (ref 150–400)
POTASSIUM SERPL-MCNC: 4.1 MMOL/L — SIGNIFICANT CHANGE UP (ref 3.5–5.3)
POTASSIUM SERPL-SCNC: 4.1 MMOL/L — SIGNIFICANT CHANGE UP (ref 3.5–5.3)
RBC # BLD: 4.6 M/UL — SIGNIFICANT CHANGE UP (ref 3.8–5.2)
RBC # FLD: 18.9 % — HIGH (ref 10.3–14.5)
SODIUM SERPL-SCNC: 144 MMOL/L — SIGNIFICANT CHANGE UP (ref 135–145)
WBC # BLD: 7.74 K/UL — SIGNIFICANT CHANGE UP (ref 3.8–10.5)
WBC # FLD AUTO: 7.74 K/UL — SIGNIFICANT CHANGE UP (ref 3.8–10.5)

## 2021-12-04 PROCEDURE — 99239 HOSP IP/OBS DSCHRG MGMT >30: CPT

## 2021-12-04 RX ORDER — SENNA PLUS 8.6 MG/1
2 TABLET ORAL
Qty: 60 | Refills: 0
Start: 2021-12-04 | End: 2022-01-02

## 2021-12-04 RX ORDER — LANOLIN ALCOHOL/MO/W.PET/CERES
1 CREAM (GRAM) TOPICAL
Qty: 30 | Refills: 0
Start: 2021-12-04 | End: 2022-01-02

## 2021-12-04 RX ORDER — LEVETIRACETAM 250 MG/1
750 TABLET, FILM COATED ORAL ONCE
Refills: 0 | Status: COMPLETED | OUTPATIENT
Start: 2021-12-04 | End: 2021-12-04

## 2021-12-04 RX ORDER — PANTOPRAZOLE SODIUM 20 MG/1
1 TABLET, DELAYED RELEASE ORAL
Qty: 30 | Refills: 0
Start: 2021-12-04 | End: 2022-01-02

## 2021-12-04 RX ORDER — ATORVASTATIN CALCIUM 80 MG/1
1 TABLET, FILM COATED ORAL
Qty: 30 | Refills: 0
Start: 2021-12-04 | End: 2022-01-02

## 2021-12-04 RX ORDER — LEVETIRACETAM 250 MG/1
1 TABLET, FILM COATED ORAL
Qty: 60 | Refills: 0
Start: 2021-12-04 | End: 2022-01-02

## 2021-12-04 RX ORDER — METOPROLOL TARTRATE 50 MG
1 TABLET ORAL
Qty: 60 | Refills: 0
Start: 2021-12-04 | End: 2022-01-02

## 2021-12-04 RX ADMIN — INFLUENZA VIRUS VACCINE 0.7 MILLILITER(S): 15; 15; 15; 15 SUSPENSION INTRAMUSCULAR at 14:20

## 2021-12-04 RX ADMIN — LEVETIRACETAM 400 MILLIGRAM(S): 250 TABLET, FILM COATED ORAL at 06:44

## 2021-12-04 NOTE — DISCHARGE NOTE NURSING/CASE MANAGEMENT/SOCIAL WORK - NSDCPEFALRISK_GEN_ALL_CORE
For information on Fall & Injury Prevention, visit: [email or web address] OR  [email or web address] OR  https://www.cdc.gov/steadi/patient.html

## 2021-12-04 NOTE — DISCHARGE NOTE PROVIDER - NSDCCPCAREPLAN_GEN_ALL_CORE_FT
PRINCIPAL DISCHARGE DIAGNOSIS  Diagnosis: Seizures  Assessment and Plan of Treatment: Seizure disorder.   continue with Keppra 750mg twice daily   follow up with PCP neurologist      SECONDARY DISCHARGE DIAGNOSES  Diagnosis: Anemia due to acute blood loss  Assessment and Plan of Treatment: Eliquis held and pt was transfused a total of 4U pRBC  Return to ER if you see bleeding  continue to hold Eliquis until evaluated by Urologist.   follow up with PCP for hemoglobin monitor    Diagnosis: Hematuria  Assessment and Plan of Treatment: CT abdomen: Left nephroureteral stent in good position.  No hydronephrosis.  A nonobstructing left renal stone measures approximately 5 mm  continue to hold Eliquis until your Urology clears you for use.    Diagnosis: Acute UTI  Assessment and Plan of Treatment: status post IV anbiotic course   no further antibiotics indicated    Diagnosis: Seizures  Assessment and Plan of Treatment:     Diagnosis: Chronic atrial fibrillation  Assessment and Plan of Treatment: holding Eliquis due to hematuria  continue with metoprolol  follow up with PCP    Diagnosis: Dementia  Assessment and Plan of Treatment: Functional Qudreplegic, Moderate protein calorie malnutrition  continue health shakes daily, and encourage oral intake   continue with supportive care

## 2021-12-04 NOTE — DISCHARGE NOTE PROVIDER - NSDCQMCOGNITION_NEU_ALL_CORE
BRIEF OPERATIVE NOTE    Date of Procedure: 8/26/2017   Preoperative Diagnosis: Ureteral stone [N20.1]  Postoperative Diagnosis: Ureteral stone [N20.1]    Procedure(s):  CYSTOSCOPY LEFT STONE Basket EXTRACTION/   Surgeon(s) and Role:      Sanaz Coley MD - Primary         Assistant Staff:       Surgical Staff:  Circ-1: Harolyn Bence, RN  Event Time In   Incision Start 1140   Incision Close 1147     Anesthesia: General   Estimated Blood Loss: Minimal  Specimens: Stone for analysis  Findings: See dictated note   Complications: None  Implants: None
Difficulty concentrating/Difficulty making decisions/Difficulty remembering

## 2021-12-04 NOTE — PROGRESS NOTE ADULT - SUBJECTIVE AND OBJECTIVE BOX
88y Female admitted with SEIZURES    ANEMIA DUE TO ACUTE BLOOD LOSS, HEMATURIA      Patient seen and examined bedside resting comfortably in no acute distress  No complaints offered.   Marie catheter in place with light tea colored output, no gross hematuria.   Denies nausea and vomiting. Tolerating diet.  Denies chest pain, dyspnea, cough.  T(F): 98.5 (12-04-21 @ 05:35), Max: 98.5 (12-04-21 @ 05:35)  HR: 81 (12-04-21 @ 05:35) (81 - 125)  BP: 131/85 (12-04-21 @ 05:35) (131/85 - 153/95)  RR: 18 (12-04-21 @ 05:35) (18 - 18)  SpO2: 98% (12-04-21 @ 05:35) (96% - 98%)  Wt(kg): --  CAPILLARY BLOOD GLUCOSE      PHYSICAL EXAM:  General: NAD, WDWN  Neuro:  Alert & oriented x 3  HEENT: NCAT, EOMI, conjunctiva clear  CV: +S1S2   Lung: Respirations nonlabored, good inspiratory effort  Abdomen: soft  Extremities: no pedal edema or calf tenderness noted   : marie catheter in place with light tea colored output. No gross hematuria.    LABS:                        11.2   7.74  )-----------( 224      ( 04 Dec 2021 07:58 )             36.0   12-04    144  |  118<H>  |  9   ----------------------------<  100<H>  4.1   |  22  |  0.50    Ca    8.3<L>      04 Dec 2021 07:58      I&O's Detail    03 Dec 2021 07:01  -  04 Dec 2021 07:00  --------------------------------------------------------  IN:    IV PiggyBack: 200 mL    Oral Fluid: 180 mL  Total IN: 380 mL    OUT:    Indwelling Catheter - Urethral (mL): 1300 mL  Total OUT: 1300 mL    Total NET: -920 mL      A/P: 88F with a PMH of CVA with L sided weakness, Afib on Eliquis seizures on keppra, HTN, HLD, nephrolithiasis w/ L ureteral stent 2/2 stone who was sent to the ED for seizures, found to have gross hematuria with ABLA requiring 4uPRBC, with appropriate response. Gross hematuria resolved and H&H remains stable.   -continue marie catheter and urine output monitoring.   -continue VTE prophylaxis  -pt can be discharged with recommended f/u with outpatient urologist for ureteral stent removal.  88y Female admitted with SEIZURES    ANEMIA DUE TO ACUTE BLOOD LOSS, HEMATURIA  Patient seen and examined at bedside, being fed by nursing staff, restraints on, resting comfortably in no acute distress. Marie draining well. Per Nursing staff hematuria has resolved since night shift.      T(F): 98.5 (12-04-21 @ 05:35), Max: 98.5 (12-04-21 @ 05:35)  HR: 81 (12-04-21 @ 05:35) (81 - 125)  BP: 131/85 (12-04-21 @ 05:35) (131/85 - 153/95)  RR: 18 (12-04-21 @ 05:35) (18 - 18)  SpO2: 98% (12-04-21 @ 05:35) (96% - 98%)  Wt(kg): --  CAPILLARY BLOOD GLUCOSE      PHYSICAL EXAM:  General: NAD, WDWN  Neuro:  Alert & oriented to person,   CV: +S1S2   Lung: Respirations nonlabored, good inspiratory effort  Abdomen: soft  : Marie indwelling with light tea colored output, 1300cc/24hrs. No gross hematuria.   Extremities: no pedal edema or calf tenderness noted       LABS:                        11.2   7.74  )-----------( 224      ( 04 Dec 2021 07:58 )             36.0   12-04    144  |  118<H>  |  9   ----------------------------<  100<H>  4.1   |  22  |  0.50    Ca    8.3<L>      04 Dec 2021 07:58      I&O's Detail    03 Dec 2021 07:01  -  04 Dec 2021 07:00  --------------------------------------------------------  IN:    IV PiggyBack: 200 mL    Oral Fluid: 180 mL  Total IN: 380 mL    OUT:    Indwelling Catheter - Urethral (mL): 1300 mL  Total OUT: 1300 mL    Total NET: -920 mL      A/P: 88F with a PMH of CVA with L sided weakness, Afib on Eliquis seizures on keppra, HTN, HLD, nephrolithiasis w/ L ureteral stent 2/2 stone who was sent to the ED for seizures, found to have gross hematuria with ABLA requiring 4uPRBC, with appropriate response. Gross hematuria resolved and H&H remains stable.   -continue marie catheter and urine output monitoring.   -continue VTE prophylaxis  -pt can be discharged with recommended f/u outpatient with her urologist for ureteral stent removal  - cont medical management  - plan discussed with Dr. Martinez and Dr. Samuel

## 2021-12-04 NOTE — DISCHARGE NOTE PROVIDER - CARE PROVIDER_API CALL
Naun Beauchamp  Urology  [address], [address]  [place] [address]  Phone: [phone]  Fax: [phone]  Follow Up Time:

## 2021-12-04 NOTE — PROGRESS NOTE ADULT - SUBJECTIVE AND OBJECTIVE BOX
Neurology Progress Note    No acute events. No seizures noted.     Neuro Exam: Drowsy and Ox2. Mild dysarthria. Mild left facial droop. PERRL. Left hemiparesis with increased tone on the left.    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 Seizure probable provoked by severe anemia  - no aspirin/AC due to anemia; re-start when ok with primary team  - Lipitor for secondary stroke prevention  - neuro stable

## 2021-12-04 NOTE — DISCHARGE NOTE NURSING/CASE MANAGEMENT/SOCIAL WORK - PATIENT PORTAL LINK FT
You can access the FollowMyHealth Patient Portal offered by [facility] by registering at the following website: [email or web address]. By joining [employer]’s FollowMyHealth portal, you will also be able to view your health information using other applications (apps) compatible with our system.

## 2021-12-04 NOTE — PROGRESS NOTE ADULT - PROVIDER SPECIALTY LIST ADULT
Neurology
Urology
Internal Medicine
Neurology
Urology
Urology
Hospitalist

## 2021-12-04 NOTE — DISCHARGE NOTE PROVIDER - NSDCMRMEDTOKEN_GEN_ALL_CORE_FT
atorvastatin 20 mg oral tablet: 1 tab(s) orally once a day (at bedtime)  levETIRAcetam 750 mg oral tablet: 1 tab(s) orally 2 times a day  melatonin 3 mg oral tablet: 1 tab(s) orally once a day (at bedtime)  metoprolol tartrate 25 mg oral tablet: 1 tab(s) orally 2 times a day  pantoprazole 40 mg oral delayed release tablet: 1 tab(s) orally once a day (before a meal)  phenazopyridine 200 mg oral tablet: 1 tab(s) orally 3 times a day (after meals)  senna oral tablet: 2 tab(s) orally once a day (at bedtime)  tamsulosin 0.4 mg oral capsule: 1 cap(s) orally once a day (at bedtime)

## 2021-12-04 NOTE — DISCHARGE NOTE PROVIDER - DETAILS OF MALNUTRITION DIAGNOSIS/DIAGNOSES
This patient has been assessed with a concern for Malnutrition and was treated during this hospitalization for the following Nutrition diagnosis/diagnoses:     -  12/02/2021: Moderate protein-calorie malnutrition

## 2021-12-04 NOTE — DISCHARGE NOTE NURSING/CASE MANAGEMENT/SOCIAL WORK - NSDCVIVACCINE_GEN_ALL_CORE_FT
No Vaccines Administered. influenza, high-dose, quadrivalent; 04-Dec-2021 14:20; Nicole Chavez (RN); [employer]; [id number] (Exp. Date: 30-Jun-2022); IntraMuscular; Deltoid Right.; 0.7 milliLiter(s); VIS (VIS Published: 06-Aug-2021, VIS Presented: 04-Dec-2021);

## 2021-12-04 NOTE — DISCHARGE NOTE PROVIDER - HOSPITAL COURSE
88F with a PMH of CVA with L sided weakness, Afib on eliquis, seizures on keppra, HTN, HLD, nephrolithiasis w/ L ureteral stent who was sent to the ED for seizures.  Patient unable to provide full history.  Per ED attending who spoke to family members, patient was found to have a 1-2 minute seizure with generalized shaking and post ictal phase afterwards.  Reports that she has had seizures since her stroke in 2010.  Patient noted to have significant hematuria in ED.  Tachycardic to 143 and initially hypotensive in ED, labs reveal anemia and hypokalemia.  CT shows bladder cystitis vs malignancy.  Will admit to tele.    CT Head No Cont  IMPRESSION:  1. No CT evidence of acute intracranial pathology.  2. Multiple chronic transcortical infarcts in the right cerebral hemisphere involving the right anterior and middle cerebral artery vascular territories.  Chronic transcortical infarct in the left middle cerebral artery vascular territory. Additional foci of encephalomalacia/gliosis in the anterior inferior frontal lobes bilaterally may be sequela of prior cerebral infarction versus old hemorrhage/traumatic brain injury.  3. Complete opacification of the left maxillary and sphenoid sinuses with evidence of chronic sinusitis.  The left maxillary sinus ostium and infundibulum appear expanded with heterogeneous material that may represent an underlying polyp or mass.  ENT evaluation may be obtained as clinically warranted    CT Angio Abdomen and Pelvis w/ IV Cont  IMPRESSION:  1.  No evidence of active GI bleeding at the time of the CT examination.  2.  The rectum is distended with stool to approximately 8.8 x 8.2 cm.  Fecal impaction should be excluded clinically.  No gross CT evidence of stercoral colitis.  3.  The bladder is underdistended with asymmetric wall thickening and areas of mucosal hyperemia.  Underlying cystitis or bladder malignancy is not excluded.  An 8 mm cystic focus along the anterior bladder wall may represent an intravesical urachal cyst.  Cystoscopy may be performed as clinically warranted.  4.  Left nephroureteral stent in good position.  No hydronephrosis.  A nonobstructing left renal stone measures approximately 5 mm    The patient was admitted to Telemetry bed under seizure protocol. Neurology was consulted and followed the patient. The patient's Keppra was increased to 750mg BID. The patient tolerated increase dose well and no further Seizure occurred during hospitalization. The patient was noted to have gross hematuria and anemia. She was transfused a total of 4U pRBC. As per Urology the patient can be discharge and will need to follow up with her Urologist for stent removal.     GENERAL: NAD, well-developed, well-groomed  HEAD:  Atraumatic, Normocephalic  EYES: conjunctiva and sclera clear  ENMT: Moist mucous membranes  NECK: Supple, No JVD, Normal thyroid  CHEST/LUNG: Clear to Auscultation bilaterally; No rales, rhonchi, wheezing, or rubs  HEART: Regular rate and rhythm; No murmurs, rubs, or gallops  ABDOMEN: Soft, Nontender, Nondistended; Bowel sounds present  : indwelling marie  EXTREMITIES:  2+ Peripheral Pulses, No clubbing, cyanosis, or edema  SKIN: No rashes or lesions  NERVOUS SYSTEM: confused, bedbound     Estimated time for Discharge 45 minutes   Plan discussed with Family at bedside

## 2021-12-09 DIAGNOSIS — Z78.1 PHYSICAL RESTRAINT STATUS: ICD-10-CM

## 2021-12-09 DIAGNOSIS — Z96.0 PRESENCE OF UROGENITAL IMPLANTS: ICD-10-CM

## 2021-12-09 DIAGNOSIS — Z79.01 LONG TERM (CURRENT) USE OF ANTICOAGULANTS: ICD-10-CM

## 2021-12-09 DIAGNOSIS — F03.90 UNSPECIFIED DEMENTIA WITHOUT BEHAVIORAL DISTURBANCE: ICD-10-CM

## 2021-12-09 DIAGNOSIS — N30.01 ACUTE CYSTITIS WITH HEMATURIA: ICD-10-CM

## 2021-12-09 DIAGNOSIS — D62 ACUTE POSTHEMORRHAGIC ANEMIA: ICD-10-CM

## 2021-12-09 DIAGNOSIS — E78.5 HYPERLIPIDEMIA, UNSPECIFIED: ICD-10-CM

## 2021-12-09 DIAGNOSIS — G89.29 OTHER CHRONIC PAIN: ICD-10-CM

## 2021-12-09 DIAGNOSIS — Z74.01 BED CONFINEMENT STATUS: ICD-10-CM

## 2021-12-09 DIAGNOSIS — I10 ESSENTIAL (PRIMARY) HYPERTENSION: ICD-10-CM

## 2021-12-09 DIAGNOSIS — E44.0 MODERATE PROTEIN-CALORIE MALNUTRITION: ICD-10-CM

## 2021-12-09 DIAGNOSIS — R53.2 FUNCTIONAL QUADRIPLEGIA: ICD-10-CM

## 2021-12-09 DIAGNOSIS — K56.41 FECAL IMPACTION: ICD-10-CM

## 2021-12-09 DIAGNOSIS — N20.0 CALCULUS OF KIDNEY: ICD-10-CM

## 2021-12-09 DIAGNOSIS — I69.354 HEMIPLEGIA AND HEMIPARESIS FOLLOWING CEREBRAL INFARCTION AFFECTING LEFT NON-DOMINANT SIDE: ICD-10-CM

## 2021-12-09 DIAGNOSIS — G40.909 EPILEPSY, UNSPECIFIED, NOT INTRACTABLE, WITHOUT STATUS EPILEPTICUS: ICD-10-CM

## 2021-12-09 DIAGNOSIS — E87.6 HYPOKALEMIA: ICD-10-CM

## 2021-12-09 DIAGNOSIS — C67.9 MALIGNANT NEOPLASM OF BLADDER, UNSPECIFIED: ICD-10-CM

## 2021-12-09 DIAGNOSIS — I48.20 CHRONIC ATRIAL FIBRILLATION, UNSPECIFIED: ICD-10-CM

## 2022-04-19 RX ORDER — PHENAZOPYRIDINE HCL 100 MG
1 TABLET ORAL
Qty: 0 | Refills: 0 | DISCHARGE

## 2022-04-19 RX ORDER — TAMSULOSIN HYDROCHLORIDE 0.4 MG/1
1 CAPSULE ORAL
Qty: 0 | Refills: 0 | DISCHARGE

## 2022-04-28 NOTE — PATIENT PROFILE ADULT - PATIENT REPRESENTATIVE: ( YOU CAN CHOOSE ANY PERSON THAT CAN ASSIST YOU WITH YOUR HEALTH CARE PREFERENCES, DOES NOT HAVE TO BE A SPOUSE, IMMEDIATE FAMILY OR SIGNIFICANT OTHER/PARTNER)
Patient discharged home transported via spouse. AVS reviewed with patient, verbalized understanding and had no questions or concerns. All belongings with patient at discharge.   yes

## 2022-06-17 NOTE — PROGRESS NOTE ADULT - REASON FOR ADMISSION
Patient was scheduled for a follow up physical therapy appointment at [facility] location on 6/17/2022. Patient failed to appear for the appointment without prior notification today.           Arya Castillo, PTA  6/17/2022  
seizures, hematuria

## 2024-11-18 NOTE — DIETITIAN INITIAL EVALUATION ADULT. - PROBLEM/PLAN-4
SNF referrals were sent to [facility] area  [facility] is not able to accept r/t pt owning a home in [facility] rehab is able to accept. CM to follow up with family today regarding placement       11/18/24 [id number]   Post-Acute Status   Post-Acute Authorization Placement   Post-Acute Placement Status Referrals Sent        DISPLAY PLAN FREE TEXT

## 2025-06-02 NOTE — PATIENT PROFILE ADULT - PATIENT/CAREGIVER ACCEPTED INTERPRETER SERVICES
OPEN ACCESS ANTHONY   
PRESCREENING FOR OPEN ACCESS SCHEDULING    Homer Robles, 1969  [id number]    06/02/25    If, the patient answers yes to any of the following questions the provider will be informed prior to scheduling open access for approval and documented in the chart.    [x]  Yes  [] No    1. Have you ever had a colonoscopy in the past?      When:   2012      Where:       Polyps or other: no    []  Yes  [x] No    2. Family history of colon cancer?      Relation:       Age of onset:       Do you currently have any of the following?    []  Yes  [x] No  Rectal bleeding, if so, how long?     []  Yes  [x] No  Abdominal pain, if so, how long?    []  Yes  [x] No  Constipation, if so, how long?    []  Yes  [x] No  Diarrhea, if so, how long?    []  Yes  [x] No  Weight loss, is so, how much?    [] Yes  [x] No  Small caliber stool, if so, how long?    []Yes  [x] No  Do you have Hemorroids?    []Yes  [x] No  Have you been diagnosed with Anemia?    []Yes  [x] No  Do you have difficulty swallowing?  []Yes  [x] No  Do you have a history of esophageal stricture or dilation?(If yes do not schedule with Dr Flanagan)    []Yes  [x] No  Do you have acid reflux?    Have you ever had any of the following conditions?    [] Yes  [x] No  Heart attack?      When?       Last cardiac workup?     Current Cardiologist?     Blood thinners?    [] Yes  [x] No   Lung problems, asthma or COPD?  [] Yes  [x] No  Oxygen required?       [] Yes  [x] No  Stroke?     [] Yes  [x] No  Have you ever had a reaction to anesthesia?           
Pt is scheduled for colonoscopy on 7/11/25 at [facility] with Dr Parra.  Miralax prep sent to pharmacy.  Chart reviewed and ok to proceed as open access.  Case requested, instructions sent by [employer], written in OR book.  
no